# Patient Record
Sex: MALE | Race: WHITE | NOT HISPANIC OR LATINO | RURAL
[De-identification: names, ages, dates, MRNs, and addresses within clinical notes are randomized per-mention and may not be internally consistent; named-entity substitution may affect disease eponyms.]

---

## 2023-02-10 ENCOUNTER — HOSPITAL ENCOUNTER (OUTPATIENT)
Dept: RADIOLOGY | Facility: HOSPITAL | Age: 73
Discharge: HOME | End: 2023-02-10
Attending: NURSE PRACTITIONER
Payer: MEDICARE

## 2023-02-10 DIAGNOSIS — R79.89 ELEVATED SERUM CREATININE: ICD-10-CM

## 2023-02-10 PROCEDURE — 76770 US EXAM ABDO BACK WALL COMP: CPT | Mod: TC

## 2024-05-31 RX ORDER — POTASSIUM CHLORIDE 20 MEQ/1
20 TABLET, EXTENDED RELEASE ORAL 2 TIMES DAILY
Status: CANCELLED | OUTPATIENT
Start: 2024-05-31

## 2024-05-31 RX ORDER — NITROGLYCERIN 0.4 MG/1
0.4 TABLET SUBLINGUAL EVERY 5 MIN PRN
Status: CANCELLED | OUTPATIENT
Start: 2024-05-31

## 2024-05-31 RX ORDER — NAPROXEN SODIUM 220 MG/1
81 TABLET, FILM COATED ORAL DAILY
Status: CANCELLED | OUTPATIENT
Start: 2024-06-01

## 2024-05-31 RX ORDER — HYDROXYZINE HYDROCHLORIDE 25 MG/1
25 TABLET, FILM COATED ORAL 3 TIMES DAILY PRN
Status: CANCELLED | OUTPATIENT
Start: 2024-05-31

## 2024-05-31 RX ORDER — LANOLIN ALCOHOL/MO/W.PET/CERES
1 CREAM (GRAM) TOPICAL DAILY
Status: CANCELLED | OUTPATIENT
Start: 2024-06-01

## 2024-05-31 RX ORDER — HYDROCODONE BITARTRATE AND ACETAMINOPHEN 10; 325 MG/1; MG/1
1 TABLET ORAL EVERY 6 HOURS PRN
Status: CANCELLED | OUTPATIENT
Start: 2024-05-31

## 2024-05-31 RX ORDER — CITALOPRAM 20 MG/1
20 TABLET, FILM COATED ORAL DAILY
Status: CANCELLED | OUTPATIENT
Start: 2024-06-01

## 2024-05-31 RX ORDER — GLUCAGON 1 MG
1 KIT INJECTION
Status: CANCELLED | OUTPATIENT
Start: 2024-05-31

## 2024-05-31 RX ORDER — ATORVASTATIN CALCIUM 40 MG/1
40 TABLET, FILM COATED ORAL NIGHTLY
Status: CANCELLED | OUTPATIENT
Start: 2024-05-31

## 2024-05-31 RX ORDER — CHOLECALCIFEROL (VITAMIN D3) 25 MCG
1000 TABLET ORAL DAILY
Status: CANCELLED | OUTPATIENT
Start: 2024-06-01

## 2024-05-31 RX ORDER — ACETAMINOPHEN 325 MG/1
650 TABLET ORAL EVERY 6 HOURS PRN
Status: CANCELLED | OUTPATIENT
Start: 2024-05-31

## 2024-05-31 RX ORDER — ASCORBIC ACID 500 MG
500 TABLET ORAL DAILY
Status: CANCELLED | OUTPATIENT
Start: 2024-06-01

## 2024-05-31 RX ORDER — FUROSEMIDE 40 MG/1
40 TABLET ORAL DAILY
Status: CANCELLED | OUTPATIENT
Start: 2024-06-01

## 2024-05-31 RX ORDER — METHOCARBAMOL 500 MG/1
500 TABLET, FILM COATED ORAL EVERY 6 HOURS PRN
Status: CANCELLED | OUTPATIENT
Start: 2024-05-31

## 2024-05-31 RX ORDER — GABAPENTIN 300 MG/1
300 CAPSULE ORAL 2 TIMES DAILY
Status: CANCELLED | OUTPATIENT
Start: 2024-05-31

## 2024-05-31 RX ORDER — TAMSULOSIN HYDROCHLORIDE 0.4 MG/1
0.4 CAPSULE ORAL DAILY
Status: CANCELLED | OUTPATIENT
Start: 2024-06-01

## 2024-05-31 RX ORDER — SPIRONOLACTONE 25 MG/1
25 TABLET ORAL DAILY
Status: CANCELLED | OUTPATIENT
Start: 2024-06-01

## 2024-05-31 RX ORDER — AMOXICILLIN AND CLAVULANATE POTASSIUM 875; 125 MG/1; MG/1
1 TABLET, FILM COATED ORAL EVERY 12 HOURS
Status: CANCELLED | OUTPATIENT
Start: 2024-05-31 | End: 2024-06-07

## 2024-05-31 RX ORDER — ZOLPIDEM TARTRATE 5 MG/1
5 TABLET ORAL NIGHTLY PRN
Status: CANCELLED | OUTPATIENT
Start: 2024-05-31

## 2024-05-31 RX ORDER — ALBUTEROL SULFATE 90 UG/1
1 AEROSOL, METERED RESPIRATORY (INHALATION)
Status: CANCELLED | OUTPATIENT
Start: 2024-05-31

## 2024-05-31 RX ORDER — METHOTREXATE 2.5 MG/1
10 TABLET ORAL
Status: CANCELLED | OUTPATIENT
Start: 2024-06-04

## 2024-05-31 RX ORDER — CALCIUM CARBONATE 200(500)MG
500 TABLET,CHEWABLE ORAL 2 TIMES DAILY PRN
Status: CANCELLED | OUTPATIENT
Start: 2024-05-31

## 2024-05-31 RX ORDER — IBUPROFEN 200 MG
24 TABLET ORAL
Status: CANCELLED | OUTPATIENT
Start: 2024-05-31

## 2024-05-31 RX ORDER — TALC
6 POWDER (GRAM) TOPICAL NIGHTLY PRN
Status: CANCELLED | OUTPATIENT
Start: 2024-05-31

## 2024-05-31 RX ORDER — INSULIN ASPART 100 [IU]/ML
0-5 INJECTION, SOLUTION INTRAVENOUS; SUBCUTANEOUS
Status: CANCELLED | OUTPATIENT
Start: 2024-05-31

## 2024-05-31 RX ORDER — CARVEDILOL 6.25 MG/1
12.5 TABLET ORAL 2 TIMES DAILY
Status: CANCELLED | OUTPATIENT
Start: 2024-05-31

## 2024-05-31 RX ORDER — IBUPROFEN 200 MG
16 TABLET ORAL
Status: CANCELLED | OUTPATIENT
Start: 2024-05-31

## 2024-05-31 RX ORDER — BUDESONIDE 0.5 MG/2ML
0.5 INHALANT ORAL EVERY 12 HOURS
Status: CANCELLED | OUTPATIENT
Start: 2024-05-31

## 2024-05-31 RX ORDER — LANOLIN ALCOHOL/MO/W.PET/CERES
400 CREAM (GRAM) TOPICAL 3 TIMES DAILY
Status: CANCELLED | OUTPATIENT
Start: 2024-05-31

## 2024-05-31 RX ORDER — AMOXICILLIN 250 MG
1 CAPSULE ORAL 2 TIMES DAILY PRN
Status: CANCELLED | OUTPATIENT
Start: 2024-05-31

## 2024-05-31 RX ORDER — PREDNISONE 20 MG/1
40 TABLET ORAL DAILY
Status: CANCELLED | OUTPATIENT
Start: 2024-06-01

## 2024-05-31 RX ORDER — ALLOPURINOL 300 MG/1
300 TABLET ORAL DAILY
Status: CANCELLED | OUTPATIENT
Start: 2024-06-01

## 2024-05-31 RX ORDER — IPRATROPIUM BROMIDE AND ALBUTEROL SULFATE 2.5; .5 MG/3ML; MG/3ML
3 SOLUTION RESPIRATORY (INHALATION) EVERY 6 HOURS
Status: CANCELLED | OUTPATIENT
Start: 2024-05-31

## 2024-05-31 RX ORDER — HYDRALAZINE HYDROCHLORIDE 25 MG/1
100 TABLET, FILM COATED ORAL 3 TIMES DAILY
Status: CANCELLED | OUTPATIENT
Start: 2024-05-31

## 2024-06-01 NOTE — NURSING
Attempted to call back rito @ [facility] to let know pt can come tomorrow.   Number is [phone]

## 2024-06-01 NOTE — NURSING
Admissions from [facility] called to inform that pt wouldn't be coming today d/t doctor not discharging pt but will be here tomorrow.

## 2024-06-03 ENCOUNTER — HOSPITAL ENCOUNTER (INPATIENT)
Facility: HOSPITAL | Age: 74
LOS: 7 days | Discharge: HOME | DRG: 948 | End: 2024-06-10
Attending: HOSPITALIST | Admitting: HOSPITALIST
Payer: MEDICARE

## 2024-06-03 DIAGNOSIS — R53.1 GENERALIZED WEAKNESS: Primary | ICD-10-CM

## 2024-06-03 DIAGNOSIS — Z98.890 S/P LAMINECTOMY: ICD-10-CM

## 2024-06-03 PROBLEM — T14.8XXA SURGICAL WOUND PRESENT: Status: ACTIVE | Noted: 2024-06-03

## 2024-06-03 LAB
GLUCOSE SERPL-MCNC: 187 MG/DL (ref 70–105)
GLUCOSE SERPL-MCNC: 247 MG/DL (ref 70–105)

## 2024-06-03 PROCEDURE — 99900035 HC TECH TIME PER 15 MIN (STAT)

## 2024-06-03 PROCEDURE — 11000004 HC SNF PRIVATE

## 2024-06-03 PROCEDURE — 25000003 PHARM REV CODE 250: Performed by: NURSE PRACTITIONER

## 2024-06-03 PROCEDURE — 99900039 HC SLP GENERIC THERAPY SCREENING (STAT)

## 2024-06-03 PROCEDURE — 63600175 PHARM REV CODE 636 W HCPCS: Performed by: NURSE PRACTITIONER

## 2024-06-03 PROCEDURE — 97166 OT EVAL MOD COMPLEX 45 MIN: CPT

## 2024-06-03 PROCEDURE — 82962 GLUCOSE BLOOD TEST: CPT

## 2024-06-03 PROCEDURE — 94640 AIRWAY INHALATION TREATMENT: CPT

## 2024-06-03 PROCEDURE — 25000242 PHARM REV CODE 250 ALT 637 W/ HCPCS: Performed by: NURSE PRACTITIONER

## 2024-06-03 RX ORDER — ALBUTEROL SULFATE 0.83 MG/ML
2.5 SOLUTION RESPIRATORY (INHALATION) EVERY 4 HOURS PRN
Status: DISCONTINUED | OUTPATIENT
Start: 2024-06-03 | End: 2024-06-10 | Stop reason: HOSPADM

## 2024-06-03 RX ORDER — TAMSULOSIN HYDROCHLORIDE 0.4 MG/1
0.4 CAPSULE ORAL DAILY
COMMUNITY

## 2024-06-03 RX ORDER — ALLOPURINOL 300 MG/1
300 TABLET ORAL DAILY
Status: DISCONTINUED | OUTPATIENT
Start: 2024-06-04 | End: 2024-06-10 | Stop reason: HOSPADM

## 2024-06-03 RX ORDER — ZOLPIDEM TARTRATE 5 MG/1
10 TABLET ORAL NIGHTLY PRN
Status: DISCONTINUED | OUTPATIENT
Start: 2024-06-03 | End: 2024-06-10 | Stop reason: HOSPADM

## 2024-06-03 RX ORDER — ATORVASTATIN CALCIUM 40 MG/1
40 TABLET, FILM COATED ORAL NIGHTLY
Status: DISCONTINUED | OUTPATIENT
Start: 2024-06-03 | End: 2024-06-10 | Stop reason: HOSPADM

## 2024-06-03 RX ORDER — TALC
6 POWDER (GRAM) TOPICAL NIGHTLY PRN
Status: DISCONTINUED | OUTPATIENT
Start: 2024-06-03 | End: 2024-06-03

## 2024-06-03 RX ORDER — HYDROXYZINE HYDROCHLORIDE 25 MG/1
25 TABLET, FILM COATED ORAL 3 TIMES DAILY PRN
COMMUNITY

## 2024-06-03 RX ORDER — IBUPROFEN 200 MG
16 TABLET ORAL
Status: DISCONTINUED | OUTPATIENT
Start: 2024-06-03 | End: 2024-06-06

## 2024-06-03 RX ORDER — METFORMIN HYDROCHLORIDE 1000 MG/1
1000 TABLET ORAL 2 TIMES DAILY WITH MEALS
COMMUNITY

## 2024-06-03 RX ORDER — AMOXICILLIN AND CLAVULANATE POTASSIUM 875; 125 MG/1; MG/1
1 TABLET, FILM COATED ORAL EVERY 12 HOURS
Status: COMPLETED | OUTPATIENT
Start: 2024-06-03 | End: 2024-06-10

## 2024-06-03 RX ORDER — GABAPENTIN 300 MG/1
300 CAPSULE ORAL 2 TIMES DAILY
Status: DISCONTINUED | OUTPATIENT
Start: 2024-06-03 | End: 2024-06-10 | Stop reason: HOSPADM

## 2024-06-03 RX ORDER — ASCORBIC ACID 500 MG
500 TABLET ORAL DAILY
Status: DISCONTINUED | OUTPATIENT
Start: 2024-06-04 | End: 2024-06-10 | Stop reason: HOSPADM

## 2024-06-03 RX ORDER — BUDESONIDE 0.5 MG/2ML
0.5 INHALANT ORAL EVERY 12 HOURS
Status: DISCONTINUED | OUTPATIENT
Start: 2024-06-03 | End: 2024-06-10 | Stop reason: HOSPADM

## 2024-06-03 RX ORDER — LANOLIN ALCOHOL/MO/W.PET/CERES
400 CREAM (GRAM) TOPICAL 3 TIMES DAILY
Status: DISCONTINUED | OUTPATIENT
Start: 2024-06-03 | End: 2024-06-10 | Stop reason: HOSPADM

## 2024-06-03 RX ORDER — GLUCAGON 1 MG
1 KIT INJECTION
Status: DISCONTINUED | OUTPATIENT
Start: 2024-06-03 | End: 2024-06-06

## 2024-06-03 RX ORDER — HYDROCODONE BITARTRATE AND ACETAMINOPHEN 5; 325 MG/1; MG/1
1 TABLET ORAL EVERY 12 HOURS PRN
COMMUNITY

## 2024-06-03 RX ORDER — INSULIN ASPART 100 [IU]/ML
0-5 INJECTION, SOLUTION INTRAVENOUS; SUBCUTANEOUS
Status: DISCONTINUED | OUTPATIENT
Start: 2024-06-03 | End: 2024-06-06

## 2024-06-03 RX ORDER — IPRATROPIUM BROMIDE AND ALBUTEROL SULFATE 2.5; .5 MG/3ML; MG/3ML
3 SOLUTION RESPIRATORY (INHALATION) EVERY 6 HOURS
Status: DISCONTINUED | OUTPATIENT
Start: 2024-06-03 | End: 2024-06-10 | Stop reason: HOSPADM

## 2024-06-03 RX ORDER — METHOCARBAMOL 500 MG/1
500 TABLET, FILM COATED ORAL EVERY 6 HOURS PRN
Status: DISCONTINUED | OUTPATIENT
Start: 2024-06-03 | End: 2024-06-10 | Stop reason: HOSPADM

## 2024-06-03 RX ORDER — CALCIUM CARBONATE 200(500)MG
500 TABLET,CHEWABLE ORAL 2 TIMES DAILY PRN
Status: DISCONTINUED | OUTPATIENT
Start: 2024-06-03 | End: 2024-06-10 | Stop reason: HOSPADM

## 2024-06-03 RX ORDER — PREDNISONE 20 MG/1
40 TABLET ORAL DAILY
Status: DISCONTINUED | OUTPATIENT
Start: 2024-06-04 | End: 2024-06-07

## 2024-06-03 RX ORDER — LANOLIN ALCOHOL/MO/W.PET/CERES
1 CREAM (GRAM) TOPICAL DAILY
Status: DISCONTINUED | OUTPATIENT
Start: 2024-06-04 | End: 2024-06-10 | Stop reason: HOSPADM

## 2024-06-03 RX ORDER — SPIRONOLACTONE 25 MG/1
25 TABLET ORAL DAILY
COMMUNITY

## 2024-06-03 RX ORDER — HYDROXYZINE PAMOATE 25 MG/1
25 CAPSULE ORAL EVERY 8 HOURS PRN
Status: DISCONTINUED | OUTPATIENT
Start: 2024-06-03 | End: 2024-06-10 | Stop reason: HOSPADM

## 2024-06-03 RX ORDER — ACETAMINOPHEN 325 MG/1
650 TABLET ORAL EVERY 6 HOURS PRN
Status: DISCONTINUED | OUTPATIENT
Start: 2024-06-03 | End: 2024-06-10 | Stop reason: HOSPADM

## 2024-06-03 RX ORDER — TAMSULOSIN HYDROCHLORIDE 0.4 MG/1
0.4 CAPSULE ORAL DAILY
Status: DISCONTINUED | OUTPATIENT
Start: 2024-06-04 | End: 2024-06-10 | Stop reason: HOSPADM

## 2024-06-03 RX ORDER — CHOLECALCIFEROL (VITAMIN D3) 25 MCG
1000 TABLET ORAL DAILY
Status: DISCONTINUED | OUTPATIENT
Start: 2024-06-04 | End: 2024-06-10 | Stop reason: HOSPADM

## 2024-06-03 RX ORDER — CARVEDILOL 6.25 MG/1
12.5 TABLET ORAL 2 TIMES DAILY WITH MEALS
Status: DISCONTINUED | OUTPATIENT
Start: 2024-06-03 | End: 2024-06-10 | Stop reason: HOSPADM

## 2024-06-03 RX ORDER — NAPROXEN SODIUM 220 MG/1
81 TABLET, FILM COATED ORAL DAILY
Status: DISCONTINUED | OUTPATIENT
Start: 2024-06-04 | End: 2024-06-10 | Stop reason: HOSPADM

## 2024-06-03 RX ORDER — ZOLPIDEM TARTRATE 10 MG/1
10 TABLET ORAL NIGHTLY PRN
COMMUNITY

## 2024-06-03 RX ORDER — AMOXICILLIN 250 MG
1 CAPSULE ORAL 2 TIMES DAILY PRN
Status: DISCONTINUED | OUTPATIENT
Start: 2024-06-03 | End: 2024-06-10 | Stop reason: HOSPADM

## 2024-06-03 RX ORDER — CARVEDILOL 12.5 MG/1
12.5 TABLET ORAL 2 TIMES DAILY WITH MEALS
COMMUNITY

## 2024-06-03 RX ORDER — HYDROCODONE BITARTRATE AND ACETAMINOPHEN 10; 325 MG/1; MG/1
1 TABLET ORAL EVERY 6 HOURS PRN
Status: DISCONTINUED | OUTPATIENT
Start: 2024-06-03 | End: 2024-06-10 | Stop reason: HOSPADM

## 2024-06-03 RX ORDER — GABAPENTIN 300 MG/1
300 CAPSULE ORAL 2 TIMES DAILY
COMMUNITY

## 2024-06-03 RX ORDER — FUROSEMIDE 40 MG/1
40 TABLET ORAL DAILY
Status: DISCONTINUED | OUTPATIENT
Start: 2024-06-04 | End: 2024-06-10 | Stop reason: HOSPADM

## 2024-06-03 RX ORDER — NITROGLYCERIN 0.4 MG/1
0.4 TABLET SUBLINGUAL EVERY 5 MIN PRN
Status: DISCONTINUED | OUTPATIENT
Start: 2024-06-03 | End: 2024-06-10 | Stop reason: HOSPADM

## 2024-06-03 RX ORDER — IBUPROFEN 200 MG
24 TABLET ORAL
Status: DISCONTINUED | OUTPATIENT
Start: 2024-06-03 | End: 2024-06-06

## 2024-06-03 RX ORDER — HYDRALAZINE HYDROCHLORIDE 25 MG/1
100 TABLET, FILM COATED ORAL EVERY 12 HOURS
Status: DISCONTINUED | OUTPATIENT
Start: 2024-06-03 | End: 2024-06-10 | Stop reason: HOSPADM

## 2024-06-03 RX ORDER — ALBUTEROL SULFATE 90 UG/1
2 AEROSOL, METERED RESPIRATORY (INHALATION)
Status: DISCONTINUED | OUTPATIENT
Start: 2024-06-03 | End: 2024-06-03

## 2024-06-03 RX ORDER — SPIRONOLACTONE 25 MG/1
25 TABLET ORAL DAILY
Status: DISCONTINUED | OUTPATIENT
Start: 2024-06-04 | End: 2024-06-10 | Stop reason: HOSPADM

## 2024-06-03 RX ORDER — PREDNISONE 20 MG/1
20 TABLET ORAL DAILY
COMMUNITY

## 2024-06-03 RX ADMIN — IPRATROPIUM BROMIDE AND ALBUTEROL SULFATE 3 ML: 2.5; .5 SOLUTION RESPIRATORY (INHALATION) at 07:06

## 2024-06-03 RX ADMIN — IPRATROPIUM BROMIDE AND ALBUTEROL SULFATE 3 ML: 2.5; .5 SOLUTION RESPIRATORY (INHALATION) at 11:06

## 2024-06-03 RX ADMIN — BUDESONIDE INHALATION 0.5 MG: 0.5 SUSPENSION RESPIRATORY (INHALATION) at 07:06

## 2024-06-03 RX ADMIN — CARVEDILOL 12.5 MG: 6.25 TABLET, FILM COATED ORAL at 04:06

## 2024-06-03 RX ADMIN — AMOXICILLIN AND CLAVULANATE POTASSIUM 1 TABLET: 875; 125 TABLET, FILM COATED ORAL at 08:06

## 2024-06-03 RX ADMIN — INSULIN ASPART 1 UNITS: 100 INJECTION, SOLUTION INTRAVENOUS; SUBCUTANEOUS at 08:06

## 2024-06-03 RX ADMIN — ZOLPIDEM TARTRATE 10 MG: 5 TABLET ORAL at 08:06

## 2024-06-03 RX ADMIN — Medication 400 MG: at 08:06

## 2024-06-03 RX ADMIN — METHOCARBAMOL 500 MG: 500 TABLET ORAL at 08:06

## 2024-06-03 RX ADMIN — ATORVASTATIN CALCIUM 40 MG: 40 TABLET, FILM COATED ORAL at 08:06

## 2024-06-03 RX ADMIN — HYDRALAZINE HYDROCHLORIDE 100 MG: 25 TABLET ORAL at 08:06

## 2024-06-03 RX ADMIN — GABAPENTIN 300 MG: 300 CAPSULE ORAL at 08:06

## 2024-06-03 NOTE — PLAN OF CARE
Problem: Adult Inpatient Plan of Care  Goal: Plan of Care Review  Outcome: Progressing  Flowsheets (Taken 6/3/2024 1325)  Plan of Care Reviewed With: patient   Plan of care reviewed with patient. Patients status ongoing progressing.

## 2024-06-03 NOTE — PT/OT/SLP EVAL
SLP Screen    Date:6/3/24    SLP Screen completed this date. No skilled ST services warranted at this time. Reconsult ST upon change in status.     Brigid Dia M.S[facility] [place]-SLP

## 2024-06-03 NOTE — PLAN OF CARE
[facility]  Initial Discharge Assessment       Primary Care Provider: Lesley Henry FNP    Admission Diagnosis: No admission diagnoses are documented for this encounter.    Admission Date: 6/3/2024  Expected Discharge Date:     Transition of Care Barriers: None    Payor: MEDICARE / Plan: MEDICARE PART A & B / Product Type: Government /     No emergency contact information on file.    Discharge Plan A: Home Health       No Pharmacies Listed    Initial Assessment (most recent)       Adult Discharge Assessment - 06/03/24 0165          Discharge Assessment    Assessment Type Discharge Planning Assessment     Confirmed/corrected address, phone number and insurance Yes     Source of Information patient     Communicated JONNIE with patient/caregiver Date not available/Unable to determine     Reason For Admission inpt PT/OT     People in Home alone     Facility Arrived From: [facility]     Do you expect to return to your current living situation? Yes     Do you have help at home or someone to help you manage your care at home? Yes     Prior to hospitilization cognitive status: Unable to Assess     Current cognitive status: Alert/Oriented     Walking or Climbing Stairs Difficulty yes     Walking or Climbing Stairs ambulation difficulty, requires equipment;stair climbing difficulty, requires equipment;transferring difficulty, assistance 1 person     Dressing/Bathing Difficulty no     Home Accessibility wheelchair accessible     Home Layout Able to live on 1st floor     Equipment Currently Used at Home cane, straight;walker, standard     Readmission within 30 days? No     Patient currently being followed by outpatient case management? No     Do you currently have service(s) that help you manage your care at home? Yes     Name and Contact number of agency does not know home health company     Is the pt/caregiver preference to resume services with current agency Yes     Do you take prescription  medications? Yes     Do you have prescription coverage? Yes     Do you have any problems affording any of your prescribed medications? No     Is the patient taking medications as prescribed? yes     Who is going to help you get home at discharge? Alejandro, his friend     How do you get to doctors appointments? car, drives self;family or friend will provide     Do you take coumadin? No     Discharge Plan A Home Health     DME Needed Upon Discharge  other (see comments)   tbd    Discharge Plan discussed with: Patient;Friend     Transition of Care Barriers None        Physical Activity    On average, how many days per week do you engage in moderate to strenuous exercise (like a brisk walk)? 0 days     On average, how many minutes do you engage in exercise at this level? 0 min        Financial Resource Strain    How hard is it for you to pay for the very basics like food, housing, medical care, and heating? Not hard at all        Housing Stability    In the last 12 months, was there a time when you were not able to pay the mortgage or rent on time? No     At any time in the past 12 months, were you homeless or living in a shelter (including now)? No        Transportation Needs    Has the lack of transportation kept you from medical appointments, meetings, work or from getting things needed for daily living? No        Food Insecurity    Within the past 12 months, you worried that your food would run out before you got the money to buy more. Never true     Within the past 12 months, the food you bought just didn't last and you didn't have money to get more. Never true        Stress    Do you feel stress - tense, restless, nervous, or anxious, or unable to sleep at night because your mind is troubled all the time - these days? Not at all        Social Isolation    How often do you feel lonely or isolated from those around you?  Sometimes        Alcohol Use    Q1: How often do you have a drink containing alcohol? Never     Q2:  How many drinks containing alcohol do you have on a typical day when you are drinking? Patient does not drink     Q3: How often do you have six or more drinks on one occasion? Never        Spindrift Beverageities    In the past 12 months has the electric, gas, oil, or water company threatened to shut off services in your home? No        Health Literacy    How often do you need to have someone help you when you read instructions, pamphlets, or other written material from your doctor or pharmacy? Never                     Mr. Madera admissted to [facility] for inpatient treatment after having a surgery. He states he is independent at baseline. He lives alone and Is followed by Beau Dickson. Pharmacy is [facility]. Will continue to follow for dc needs.

## 2024-06-03 NOTE — PT/OT/SLP EVAL
"Occupational Therapy   Evaluation    Name: Mo Madera  MRN: [id number]  Admitting Diagnosis: s/p laminectomy lumbar  Recent Surgery: * No surgery found *      Recommendations:     Discharge Recommendations: Low Intensity Therapy  Discharge Equipment Recommendations:  none  Barriers to discharge:  Decreased caregiver support    Assessment:     Mo Madera is a 74 y.o. male with a medical diagnosis of s/p laminectomy lumbar.  He presents with "I know I have to get up and move to get better.". Performance deficits affecting function: weakness, impaired endurance, impaired self care skills, impaired functional mobility, gait instability, impaired balance, decreased safety awareness, pain, impaired skin.      Rehab Prognosis: Good; patient would benefit from acute skilled OT services to address these deficits and reach maximum level of function.       Plan:     Patient to be seen 5 x/week to address the above listed problems via self-care/home management, community/work re-entry, therapeutic activities, therapeutic exercises, neuromuscular re-education, wheelchair management/training  Plan of Care Expires: 06/28/24  Plan of Care Reviewed with: patient    Subjective     Chief Complaint: pain  Patient/Family Comments/goals: get back home, able to do for myself    Occupational Profile:  Living Environment: double wide with ramped entrance  Previous level of function: was driving self, used cane or SW to get around home, had bath bench as well with grab bar in standard tub/shower unit  Roles and Routines: retired  Equipment Used at Home: cane, straight, walker, standard, grab bar, bath bench, hip kit  Assistance upon Discharge: intermittent svn from family with assist as needed    Pain/Comfort:  Pain Rating 1: 5/10  Location - Side 1: Bilateral  Location 1: back  Pain Addressed 1: Reposition, Distraction, Cessation of Activity  Pain Rating Post-Intervention 1: 2/10    Patients cultural, spiritual, Orthodox conflicts given " the current situation: no    Objective:     Communicated with: pt prior to session.  Patient found HOB elevated with  (no lines or drains) upon OT entry to room.    General Precautions: Standard, fall, hearing impaired  Orthopedic Precautions: Full weight bearing  Braces: N/A  Respiratory Status: Room air    Occupational Performance:    Bed Mobility:    Patient completed Rolling/Turning to Left with  minimum assistance  Patient completed Rolling/Turning to Right with moderate assistance  Patient completed Scooting/Bridging with moderate assistance  Patient completed Supine to Sit with minimum assistance  Patient completed Sit to Supine with moderate assistance    Functional Mobility/Transfers:  Patient completed Sit <> Stand Transfer with moderate assistance  with  hand-held assist and grab bars(s)   Functional Mobility: none attempted other than bed mobility today    Activities of Daily Living:  Feeding:  independence at bedside after setup  Grooming: minimum assistance for thoroughness and assist to setup at bedside  Bathing: moderate assistance overall (min a UB, max a LB)  Upper Body Dressing: minimum assistance bedside  Lower Body Dressing: moderate assistance pt is supposed to have ADL equipment for LB dressing per notes from previous therapy at surgical hospital, will implement here as pt can tolerate  Toileting: moderate assistance for transfer and for pericare during BM    Cognitive/Visual Perceptual:  Cognitive/Psychosocial Skills:     -       Oriented to: Person, Place, Time, and Situation   -       Follows Commands/attention:Follows two-step commands  -       Communication: clear/fluent  -       Memory: No Deficits noted  -       Safety awareness/insight to disability: none noted today, but therapy from previous hospital indicated pt was having some safety deficits observed.  Possibly due to post anesthesia; will follow   -       Mood/Affect/Coping skills/emotional control: Appropriate to situation,  Cooperative, and Pleasant  Visual/Perceptual:      -Intact no problems noted today    Physical Exam:  Balance:    -       sitting up EOB unsupported soft surface=SBA, standing static=fair -, did not attempt dynamic standing  Dominant hand:    -       right  Upper Extremity Range of Motion:     -       Right Upper Extremity: Deficits: 75% of AROM sh flexion, rest of joints UB WNL  -       Left Upper Extremity: same  Upper Extremity Strength:    -       Right Upper Extremity: Deficits: 3+/5  -       Left Upper Extremity: Deficits: 3+/5   Strength:    -       Right Upper Extremity: WFL  -       Left Upper Extremity: WFL  Fine Motor Coordination:    -       Intact  Gross motor coordination:   WFL    AMPAC 6 Click ADL:  AMPAC Total Score: 16    Treatment & Education:  EVAL completed today; begin tx sessions tomorrow    Patient left HOB elevated with call button in reach and nursing present    GOALS:   Multidisciplinary Problems       Occupational Therapy Goals          Problem: Occupational Therapy    Goal Priority Disciplines Outcome Interventions   Occupational Therapy Goal     OT, PT/OT Progressing    Description: STG: within 2 weeks  Pt will perform grooming with setup at sinkside  Pt will bathe with mod I with AE as needed from sinkside  Pt will perform UE dressing with SVN after setup  Pt will perform LE dressing with SBA with AE as needed at bedside  Pt will sit EOB x 15 min with SVN assistance  Pt will transfer bed/chair/bsc with SBA  Pt will perform standing task x 5 min with SVN assistance  Pt will tolerate 45 minutes of tx without fatigue      LT.Restore to max I with self care and mobility.                         History:     Past Medical History:   Diagnosis Date    COPD (chronic obstructive pulmonary disease)     Diabetes mellitus     Hypertension          Past Surgical History:   Procedure Laterality Date    BACK SURGERY         Time Tracking:     OT Date of Treatment: 24  OT Start Time:  1500  OT Stop Time: 1518  OT Total Time (min): 18 min    Billable Minutes:Evaluation 18    6/3/2024

## 2024-06-03 NOTE — NURSING
VERIFIED HOME MEDICATIONS WITH [facility] IN [place], [place]

## 2024-06-03 NOTE — PLAN OF CARE
Problem: Occupational Therapy  Goal: Occupational Therapy Goal  Description: STG: within 2 weeks  Pt will perform grooming with setup at sinkside  Pt will bathe with mod I with AE as needed from sinkside  Pt will perform UE dressing with SVN after setup  Pt will perform LE dressing with SBA with AE as needed at bedside  Pt will sit EOB x 15 min with SVN assistance  Pt will transfer bed/chair/bsc with SBA  Pt will perform standing task x 5 min with SVN assistance  Pt will tolerate 45 minutes of tx without fatigue      LT.Restore to max I with self care and mobility.    Outcome: Progressing

## 2024-06-04 PROBLEM — E11.9 DM2 (DIABETES MELLITUS, TYPE 2): Status: ACTIVE | Noted: 2024-06-04

## 2024-06-04 LAB
ANION GAP SERPL CALCULATED.3IONS-SCNC: 10 MMOL/L (ref 7–16)
BASOPHILS # BLD AUTO: 0.02 K/UL (ref 0–0.2)
BASOPHILS NFR BLD AUTO: 0.3 % (ref 0–1)
BUN SERPL-MCNC: 20 MG/DL (ref 7–18)
BUN/CREAT SERPL: 18 (ref 6–20)
CALCIUM SERPL-MCNC: 8.4 MG/DL (ref 8.5–10.1)
CHLORIDE SERPL-SCNC: 97 MMOL/L (ref 98–107)
CO2 SERPL-SCNC: 30 MMOL/L (ref 21–32)
CREAT SERPL-MCNC: 1.14 MG/DL (ref 0.7–1.3)
DIFFERENTIAL METHOD BLD: ABNORMAL
EGFR (NO RACE VARIABLE) (RUSH/TITUS): 68 ML/MIN/1.73M2
EOSINOPHIL # BLD AUTO: 0.14 K/UL (ref 0–0.5)
EOSINOPHIL NFR BLD AUTO: 2.1 % (ref 1–4)
ERYTHROCYTE [DISTWIDTH] IN BLOOD BY AUTOMATED COUNT: 12.4 % (ref 11.5–14.5)
GLUCOSE SERPL-MCNC: 154 MG/DL (ref 74–106)
GLUCOSE SERPL-MCNC: 156 MG/DL (ref 70–105)
GLUCOSE SERPL-MCNC: 297 MG/DL (ref 70–105)
GLUCOSE SERPL-MCNC: 409 MG/DL (ref 70–105)
GLUCOSE SERPL-MCNC: 429 MG/DL (ref 70–105)
HCT VFR BLD AUTO: 27 % (ref 40–54)
HGB BLD-MCNC: 8.7 G/DL (ref 13.5–18)
LYMPHOCYTES # BLD AUTO: 1.7 K/UL (ref 1–4.8)
LYMPHOCYTES NFR BLD AUTO: 25.8 % (ref 27–41)
MCH RBC QN AUTO: 29.2 PG (ref 27–31)
MCHC RBC AUTO-ENTMCNC: 32.2 G/DL (ref 32–36)
MCV RBC AUTO: 90.6 FL (ref 80–96)
MONOCYTES # BLD AUTO: 1.14 K/UL (ref 0–0.8)
MONOCYTES NFR BLD AUTO: 17.3 % (ref 2–6)
MPC BLD CALC-MCNC: 8.8 FL (ref 9.4–12.4)
NEUTROPHILS # BLD AUTO: 3.58 K/UL (ref 1.8–7.7)
NEUTROPHILS NFR BLD AUTO: 54.5 % (ref 53–65)
PLATELET # BLD AUTO: 306 K/UL (ref 150–400)
POTASSIUM SERPL-SCNC: 3.8 MMOL/L (ref 3.5–5.1)
RBC # BLD AUTO: 2.98 M/UL (ref 4.6–6.2)
SODIUM SERPL-SCNC: 133 MMOL/L (ref 136–145)
WBC # BLD AUTO: 6.58 K/UL (ref 4.5–11)

## 2024-06-04 PROCEDURE — 25000003 PHARM REV CODE 250: Performed by: NURSE PRACTITIONER

## 2024-06-04 PROCEDURE — 97163 PT EVAL HIGH COMPLEX 45 MIN: CPT

## 2024-06-04 PROCEDURE — 63600175 PHARM REV CODE 636 W HCPCS: Performed by: NURSE PRACTITIONER

## 2024-06-04 PROCEDURE — 94640 AIRWAY INHALATION TREATMENT: CPT

## 2024-06-04 PROCEDURE — 80048 BASIC METABOLIC PNL TOTAL CA: CPT | Performed by: HOSPITALIST

## 2024-06-04 PROCEDURE — 25000242 PHARM REV CODE 250 ALT 637 W/ HCPCS: Performed by: NURSE PRACTITIONER

## 2024-06-04 PROCEDURE — 97535 SELF CARE MNGMENT TRAINING: CPT

## 2024-06-04 PROCEDURE — 97110 THERAPEUTIC EXERCISES: CPT

## 2024-06-04 PROCEDURE — 85025 COMPLETE CBC W/AUTO DIFF WBC: CPT | Performed by: HOSPITALIST

## 2024-06-04 PROCEDURE — 97530 THERAPEUTIC ACTIVITIES: CPT

## 2024-06-04 PROCEDURE — 36415 COLL VENOUS BLD VENIPUNCTURE: CPT | Performed by: HOSPITALIST

## 2024-06-04 PROCEDURE — 11000004 HC SNF PRIVATE

## 2024-06-04 PROCEDURE — 82962 GLUCOSE BLOOD TEST: CPT

## 2024-06-04 PROCEDURE — 99305 1ST NF CARE MODERATE MDM 35: CPT | Mod: AI,,, | Performed by: HOSPITALIST

## 2024-06-04 RX ADMIN — IPRATROPIUM BROMIDE AND ALBUTEROL SULFATE 3 ML: 2.5; .5 SOLUTION RESPIRATORY (INHALATION) at 07:06

## 2024-06-04 RX ADMIN — TAMSULOSIN HYDROCHLORIDE 0.4 MG: 0.4 CAPSULE ORAL at 08:06

## 2024-06-04 RX ADMIN — AMOXICILLIN AND CLAVULANATE POTASSIUM 1 TABLET: 875; 125 TABLET, FILM COATED ORAL at 08:06

## 2024-06-04 RX ADMIN — CARVEDILOL 12.5 MG: 6.25 TABLET, FILM COATED ORAL at 04:06

## 2024-06-04 RX ADMIN — HYDRALAZINE HYDROCHLORIDE 100 MG: 25 TABLET ORAL at 08:06

## 2024-06-04 RX ADMIN — GABAPENTIN 300 MG: 300 CAPSULE ORAL at 08:06

## 2024-06-04 RX ADMIN — THERA TABS 1 TABLET: TAB at 08:06

## 2024-06-04 RX ADMIN — Medication 1000 UNITS: at 08:06

## 2024-06-04 RX ADMIN — SPIRONOLACTONE 25 MG: 25 TABLET ORAL at 08:06

## 2024-06-04 RX ADMIN — OXYCODONE HYDROCHLORIDE AND ACETAMINOPHEN 500 MG: 500 TABLET ORAL at 08:06

## 2024-06-04 RX ADMIN — ATORVASTATIN CALCIUM 40 MG: 40 TABLET, FILM COATED ORAL at 08:06

## 2024-06-04 RX ADMIN — INSULIN ASPART 5 UNITS: 100 INJECTION, SOLUTION INTRAVENOUS; SUBCUTANEOUS at 05:06

## 2024-06-04 RX ADMIN — HYDROCODONE BITARTRATE AND ACETAMINOPHEN 1 TABLET: 10; 325 TABLET ORAL at 08:06

## 2024-06-04 RX ADMIN — PREDNISONE 40 MG: 20 TABLET ORAL at 08:06

## 2024-06-04 RX ADMIN — IPRATROPIUM BROMIDE AND ALBUTEROL SULFATE 3 ML: 2.5; .5 SOLUTION RESPIRATORY (INHALATION) at 01:06

## 2024-06-04 RX ADMIN — FERROUS SULFATE TAB 325 MG (65 MG ELEMENTAL FE) 1 EACH: 325 (65 FE) TAB at 08:06

## 2024-06-04 RX ADMIN — Medication 400 MG: at 08:06

## 2024-06-04 RX ADMIN — Medication 400 MG: at 03:06

## 2024-06-04 RX ADMIN — BUDESONIDE INHALATION 0.5 MG: 0.5 SUSPENSION RESPIRATORY (INHALATION) at 07:06

## 2024-06-04 RX ADMIN — INSULIN ASPART 3 UNITS: 100 INJECTION, SOLUTION INTRAVENOUS; SUBCUTANEOUS at 11:06

## 2024-06-04 RX ADMIN — INSULIN ASPART 3 UNITS: 100 INJECTION, SOLUTION INTRAVENOUS; SUBCUTANEOUS at 08:06

## 2024-06-04 RX ADMIN — CARVEDILOL 12.5 MG: 6.25 TABLET, FILM COATED ORAL at 08:06

## 2024-06-04 RX ADMIN — ASPIRIN 81 MG CHEWABLE TABLET 81 MG: 81 TABLET CHEWABLE at 08:06

## 2024-06-04 RX ADMIN — ZOLPIDEM TARTRATE 10 MG: 5 TABLET ORAL at 08:06

## 2024-06-04 RX ADMIN — FUROSEMIDE 40 MG: 40 TABLET ORAL at 08:06

## 2024-06-04 RX ADMIN — ALLOPURINOL 300 MG: 300 TABLET ORAL at 08:06

## 2024-06-04 NOTE — PLAN OF CARE
Problem: Physical Therapy  Goal: Physical Therapy Goal  Description: STG - 1 week  1. Pt will transfer sit to stand with SBA.  2. Pt will amb 50 ft with CGA with appropriate AD.  3. Pt will transfer supine to/from sit with min Ax1.    LTG - 3 weeks  1. Pt will transfer sit to stand with SVN.  2. Pt will amb 250 ft with SBA with appropriate AD.  3. Pt will negotiate steps with rail with CGA.  4. Pt will negotiate ramp using AD with SBA.  5. Pt will transfer supine to/from sit with SBA.  Outcome: Progressing

## 2024-06-04 NOTE — PLAN OF CARE
Problem: Adult Inpatient Plan of Care  Goal: Plan of Care Review  Outcome: Progressing  Goal: Patient-Specific Goal (Individualized)  Outcome: Progressing  Goal: Absence of Hospital-Acquired Illness or Injury  Outcome: Progressing  Goal: Optimal Comfort and Wellbeing  Outcome: Progressing  Goal: Readiness for Transition of Care  Outcome: Progressing     Problem: Fall Injury Risk  Goal: Absence of Fall and Fall-Related Injury  Outcome: Progressing     Problem: Skin Injury Risk Increased  Goal: Skin Health and Integrity  Outcome: Progressing     Problem: Bariatric Environmental Safety  Goal: Safety Maintained with Care  Outcome: Progressing     Problem: Wound  Goal: Optimal Coping  Outcome: Progressing  Goal: Optimal Functional Ability  Outcome: Progressing  Goal: Absence of Infection Signs and Symptoms  Outcome: Progressing  Goal: Improved Oral Intake  Outcome: Progressing  Goal: Optimal Pain Control and Function  Outcome: Progressing  Goal: Skin Health and Integrity  Outcome: Progressing  Goal: Optimal Wound Healing  Outcome: Progressing

## 2024-06-04 NOTE — PROGRESS NOTES
"[facility] Medicine  Progress Note    Patient Name: Mo Madera  MRN: [id number]  Patient Class: IP- Swing   Admission Date: 6/3/2024  Length of Stay: 0 days  Attending Physician: Carlos Perez DO  Primary Care Provider: Lesley Henry FNP        Subjective:     Principal Problem:Generalized weakness        HPI:   Patient is a 74 y.o. white male with PMHx of DM, HTN, and COPD with a medical diagnosis of s/p lumbar laminectomy performed at [facility].  Per RT patient presents with "I know I have to get up and move to get better". Performance deficits affecting function: weakness, impaired endurance, impaired self care skills, impaired functional mobility, gait instability, impaired balance, decreased safety awareness, pain, impaired skin.     Overview/Hospital Course:  6/03 Patient supine in bed with eyes open in NAD. Patient current pain level 5/10. He states pain not too severe until he tries to move. Midline incision lower back with no drainage or redness of surrounding soft tissue noted. Wound edges well-approximated with staples intact. Patient states he was seen by PT on admission and scheduled to began PT tomorrow. He expresses understanding that PT will be uncomfortable by knows it is necessary to return to normal ADLs.    Review of Systems   Constitutional:  Negative for chills and fever.   Respiratory: Negative.  Negative for apnea, cough, choking, chest tightness, shortness of breath, wheezing and stridor.    Cardiovascular: Negative.  Negative for chest pain, palpitations and leg swelling.   Gastrointestinal: Negative.  Negative for abdominal distention and abdominal pain.   Genitourinary: Negative.    Musculoskeletal:  Positive for back pain.   Skin:  Positive for wound.        Surgical wound lumbar spine   Neurological:  Positive for weakness. Negative for dizziness, tremors, seizures, syncope, facial asymmetry, speech difficulty, light-headedness, numbness " and headaches.   Psychiatric/Behavioral: Negative.     All other systems reviewed and are negative.    Objective:     Vital Signs (Most Recent):  Temp: 98.8 °F (37.1 °C) (06/03/24 1929)  Pulse: 88 (06/03/24 1929)  Resp: 20 (06/03/24 1929)  BP: 129/72 (06/03/24 1929)  SpO2: (!) 94 % (06/03/24 1929) Vital Signs (24h Range):  Temp:  [97.8 °F (36.6 °C)-98.8 °F (37.1 °C)] 98.8 °F (37.1 °C)  Pulse:  [83-88] 88  Resp:  [18-20] 20  SpO2:  [94 %-100 %] 94 %  BP: (129-138)/(66-72) 129/72     Weight: 98.4 kg (217 lb)  Body mass index is 41 kg/m².    Intake/Output Summary (Last 24 hours) at 6/3/2024 2015  Last data filed at 6/3/2024 1850  Gross per 24 hour   Intake 120 ml   Output 900 ml   Net -780 ml         Physical Exam  Vitals and nursing note reviewed.   Constitutional:       General: He is not in acute distress.     Appearance: Normal appearance. He is normal weight. He is not ill-appearing.   HENT:      Mouth/Throat:      Mouth: Mucous membranes are moist.   Eyes:      Extraocular Movements: Extraocular movements intact.      Conjunctiva/sclera: Conjunctivae normal.   Cardiovascular:      Rate and Rhythm: Normal rate and regular rhythm.      Pulses: Normal pulses.      Heart sounds: Normal heart sounds. No murmur heard.     No friction rub. No gallop.   Pulmonary:      Effort: Pulmonary effort is normal.      Breath sounds: Normal breath sounds.   Abdominal:      General: Abdomen is flat. Bowel sounds are normal.      Palpations: Abdomen is soft.   Musculoskeletal:         General: Normal range of motion.      Cervical back: Normal range of motion and neck supple.        Back:    Skin:     General: Skin is warm and dry.      Capillary Refill: Capillary refill takes 2 to 3 seconds.   Neurological:      General: No focal deficit present.      Mental Status: He is alert and oriented to person, place, and time.      Motor: Weakness present.   Psychiatric:         Mood and Affect: Mood normal.         Behavior: Behavior  "normal.         Thought Content: Thought content normal.         Judgment: Judgment normal.             Significant Labs: All pertinent labs within the past 24 hours have been reviewed.  POCT Glucose: No results for input(s): "POCTGLUCOSE" in the last 48 hours.    Significant Imaging: I have reviewed all pertinent imaging results/findings within the past 24 hours.  CXR: I have reviewed all pertinent results/findings within the past 24 hours and my personal findings are:  Enlarged cardiomediastinal silhouette    Assessment/Plan:      No notes have been filed under this hospital service.  Service: Hospital Medicine    VTE Risk Mitigation (From admission, onward)           Ordered     IP VTE HIGH RISK PATIENT  Once         06/03/24 1405     Place sequential compression device  Until discontinued         06/03/24 1405                    Discharge Planning   JONNIE:      Code Status: Full Code   Is the patient medically ready for discharge?:     Reason for patient still in hospital (select all that apply): Patient trending condition, Laboratory test, Treatment, and PT / OT recommendations  Discharge Plan A: Home Health                LEWIS Sullivan  Department of Hospital Medicine   [facility]    "

## 2024-06-04 NOTE — ASSESSMENT & PLAN NOTE
"Patient's FSGs are controlled on current medication regimen.  Last A1c reviewed-   Lab Results   Component Value Date    HGBA1C 7.1 (H) 05/21/2024     Most recent fingerstick glucose reviewed- No results for input(s): "POCTGLUCOSE" in the last 24 hours.  Current correctional scale  Low  Maintain anti-hyperglycemic dose as follows-   Antihyperglycemics (From admission, onward)      Start     Stop Route Frequency Ordered    06/03/24 1610  insulin aspart U-100 injection 0-5 Units         -- SubQ Before meals & nightly PRN 06/03/24 1612          Hold Oral hypoglycemics while patient is in the hospital.  "

## 2024-06-04 NOTE — PLAN OF CARE
Problem: Breathing Pattern Ineffective  Goal: Effective Breathing Pattern  Outcome: Progressing

## 2024-06-04 NOTE — PT/OT/SLP PROGRESS
Occupational Therapy   Treatment    Name: Mo Madera  MRN: [id number]  Admitting Diagnosis:  Generalized weakness       Recommendations:     Discharge Recommendations: Low Intensity Therapy  Discharge Equipment Recommendations:  none  Barriers to discharge:  Decreased caregiver support    Assessment:     Mo Madera is a 74 y.o. male with a medical diagnosis of Generalized weakness.  He presents with readiness to begin therapy. Performance deficits affecting function are weakness, impaired endurance, impaired self care skills, impaired functional mobility, gait instability, impaired balance, decreased safety awareness, pain, impaired skin.     Rehab Prognosis:  Good; patient would benefit from acute skilled OT services to address these deficits and reach maximum level of function.       Plan:     Patient to be seen 5 x/week to address the above listed problems via self-care/home management, community/work re-entry, therapeutic activities, therapeutic exercises, neuromuscular re-education, wheelchair management/training  Plan of Care Expires: 06/28/24  Plan of Care Reviewed with: patient    Subjective     Chief Complaint: weakness, soreness from back surgery  Patient/Family Comments/goals: home alone with intermittent svn from family as needed  Pain/Comfort:       Objective:     Communicated with: pt prior to session.  Patient found up in chair with  (no lines or drains) upon OT entry to room.    General Precautions: Standard, fall, hearing impaired    Orthopedic Precautions:Full weight bearing  Braces: N/A  Respiratory Status: Room air     Occupational Performance:     Activities of Daily Living:  Lower Body Dressing: distributing sock aid, reacher, long handled shoe spoon, long handled sponge, and dressing stick  OT provided instruction and demostration and provided pt with return demonstration opportunity.  He required min a with all equipment today but was encouraged to use daily as practice will improve over time  his effectiveness with items.  Pt agreeable.      [facility] 6 Click ADL:      Treatment & Education:  To improve endurance, strength, OT facilitated pt with:  UBE x 5 min, then 2 min with 1 RB's throughout, low level resist    To improve reach, AROM, FM/ and trunk rotation with core forward leaning engagement:   OT initiated pt with nuts and bolts board matching activity, pt agreeable to complete, nonweighted today        Patient left up in chair with call button in reach and chair alarm on    GOALS:   Multidisciplinary Problems       Occupational Therapy Goals          Problem: Occupational Therapy    Goal Priority Disciplines Outcome Interventions   Occupational Therapy Goal     OT, PT/OT Progressing    Description: STG: within 2 weeks  Pt will perform grooming with setup at sinkside  Pt will bathe with mod I with AE as needed from sinkside  Pt will perform UE dressing with SVN after setup  Pt will perform LE dressing with SBA with AE as needed at bedside  Pt will sit EOB x 15 min with SVN assistance  Pt will transfer bed/chair/bsc with SBA  Pt will perform standing task x 5 min with SVN assistance  Pt will tolerate 45 minutes of tx without fatigue      LT.Restore to max I with self care and mobility.                         Time Tracking:     OT Date of Treatment: 24  OT Start Time: 48  OT Stop Time: 1028  OT Total Time (min): 40 min    Billable Minutes:Self Care/Home Management 15  Therapeutic Activity 15  Therapeutic Exercise 10    OT/ALYSSA: OT          2024

## 2024-06-04 NOTE — HPI
" Patient is a 74 y.o. white male with PMHx of DM, HTN, and COPD with a medical diagnosis of s/p lumbar laminectomy performed at [facility].  Per RT patient presents with "I know I have to get up and move to get better". Performance deficits affecting function: weakness, impaired endurance, impaired self care skills, impaired functional mobility, gait instability, impaired balance, decreased safety awareness, pain, impaired skin.   "

## 2024-06-04 NOTE — HOSPITAL COURSE
6/03 Patient supine in bed with eyes open in NAD. Patient current pain level 5/10. He states pain not too severe until he tries to move. Midline incision lower back with no drainage or redness of surrounding soft tissue noted. Wound edges well-approximated with staples intact. Patient states he was seen by PT on admission and scheduled to began PT tomorrow. He expresses understanding that PT will be uncomfortable by knows it is necessary to return to normal ADLs.    6/5 1715 RN called to report poc Glucose 526, levemir was ordered to start tonight, encouraged RN to give now and continue SSI as ordered.  Will continue to monitor.     6/5 1959 RN called to report Glucose remains 527 after receiving levemir and SSI at 1730, reports has eaten food that family brought.  Also noted that Pt is receiving prednisone.  Will check BMP and continue to monitor. Na 128.  Pt is unsure why he takes Lasix.    Will tx with IV fluids tonight and recheck in the AM.  Will tx BS with SSI tonight but may need to adjust levemir dose tomorrow.      6/10 DC home today.  Did well with therapy had follow up at Dr cleaning

## 2024-06-04 NOTE — H&P
"  [facility] Medicine  History & Physical    Patient Name: Mo Madera  MRN: [id number]  Patient Class: IP- Swing  Admission Date: 6/3/2024  Attending Physician: Carlos Perez DO   Primary Care Provider: Lesley Henry FNP         Patient information was obtained from past medical records and ER records.     Subjective:     Principal Problem:Generalized weakness    Chief Complaint: No chief complaint on file.       HPI:  Patient is a 74 y.o. white male with PMHx of DM, HTN, and COPD with a medical diagnosis of s/p lumbar laminectomy performed at [facility].  Per RT patient presents with "I know I have to get up and move to get better". Performance deficits affecting function: weakness, impaired endurance, impaired self care skills, impaired functional mobility, gait instability, impaired balance, decreased safety awareness, pain, impaired skin.     Past Medical History:   Diagnosis Date    COPD (chronic obstructive pulmonary disease)     Diabetes mellitus     Hypertension        Past Surgical History:   Procedure Laterality Date    BACK SURGERY         Review of patient's allergies indicates:  No Known Allergies    Current Facility-Administered Medications on File Prior to Encounter   Medication    [DISCONTINUED] GENERIC EXTERNAL MEDICATION     Current Outpatient Medications on File Prior to Encounter   Medication Sig    carvediloL (COREG) 12.5 MG tablet Take 12.5 mg by mouth 2 (two) times daily with meals.    gabapentin (NEURONTIN) 300 MG capsule Take 300 mg by mouth 2 (two) times daily.    HYDROcodone-acetaminophen (NORCO) 5-325 mg per tablet Take 1 tablet by mouth every 12 (twelve) hours as needed for Pain (Take one tablet by mouth once or twice dly as needed for pain).    hydrOXYzine HCL (ATARAX) 25 MG tablet Take 25 mg by mouth 3 (three) times daily as needed for Itching.    metFORMIN (GLUCOPHAGE) 1000 MG tablet Take 1,000 mg by mouth 2 (two) times daily with " meals.    predniSONE (DELTASONE) 20 MG tablet Take 20 mg by mouth once daily.    spironolactone (ALDACTONE) 25 MG tablet Take 25 mg by mouth once daily.    tamsulosin (FLOMAX) 0.4 mg Cap Take 0.4 mg by mouth once daily.    zolpidem (AMBIEN) 10 mg Tab Take 10 mg by mouth nightly as needed.     Family History    None       Tobacco Use    Smoking status: Some Days     Types: Cigarettes    Smokeless tobacco: Never   Substance and Sexual Activity    Alcohol use: Not on file    Drug use: Not Currently    Sexual activity: Not Currently     Partners: Male     Birth control/protection: None     Review of Systems   Constitutional:  Negative for appetite change, chills and fever.   Respiratory:  Negative for cough, shortness of breath and wheezing.    Cardiovascular:  Negative for chest pain, palpitations and leg swelling.   Gastrointestinal:  Negative for abdominal distention, diarrhea, nausea and vomiting.   Genitourinary:  Negative for dysuria.   Musculoskeletal:  Positive for arthralgias and back pain.   Skin:  Negative for rash.   Neurological:  Positive for weakness. Negative for dizziness, seizures and syncope.   Psychiatric/Behavioral:  Negative for agitation, behavioral problems and confusion.    All other systems reviewed and are negative.    Objective:     Vital Signs (Most Recent):  Temp: 98.9 °F (37.2 °C) (06/04/24 0715)  Pulse: 88 (06/04/24 0856)  Resp: 20 (06/04/24 0857)  BP: (!) 146/69 (06/04/24 0856)  SpO2: 100 % (06/04/24 0749) Vital Signs (24h Range):  Temp:  [97.8 °F (36.6 °C)-98.9 °F (37.2 °C)] 98.9 °F (37.2 °C)  Pulse:  [73-97] 88  Resp:  [18-20] 20  SpO2:  [93 %-100 %] 100 %  BP: (103-146)/(66-72) 146/69     Weight: 98.4 kg (217 lb)  Body mass index is 41 kg/m².     Physical Exam  Vitals reviewed.   Constitutional:       General: He is not in acute distress.     Appearance: Normal appearance.   HENT:      Head: Normocephalic and atraumatic.   Eyes:      General: No scleral icterus.     Extraocular  Movements: Extraocular movements intact.      Conjunctiva/sclera: Conjunctivae normal.      Pupils: Pupils are equal, round, and reactive to light.   Cardiovascular:      Rate and Rhythm: Normal rate and regular rhythm.      Heart sounds: No murmur heard.     No friction rub. No gallop.   Pulmonary:      Effort: Pulmonary effort is normal. No respiratory distress.      Breath sounds: Normal breath sounds. No wheezing or rales.   Abdominal:      General: Abdomen is flat. Bowel sounds are normal. There is no distension.      Palpations: Abdomen is soft.      Tenderness: There is no abdominal tenderness. There is no guarding.   Musculoskeletal:         General: No swelling.   Skin:     General: Skin is warm and dry.      Coloration: Skin is not jaundiced.      Findings: No rash.   Neurological:      General: No focal deficit present.      Mental Status: He is alert and oriented to person, place, and time.      Sensory: No sensory deficit.      Motor: Weakness present.      Gait: Gait abnormal.   Psychiatric:         Mood and Affect: Mood normal.         Thought Content: Thought content normal.         Judgment: Judgment normal.              CRANIAL NERVES     CN III, IV, VI   Pupils are equal, round, and reactive to light.       Significant Labs: All pertinent labs within the past 24 hours have been reviewed.  Recent Lab Results  (Last 5 results in the past 24 hours)        06/04/24  1103   06/04/24  0520   06/04/24  0510   06/03/24  1953   06/03/24  1644        Anion Gap   10             Baso #   0.02             Basophil %   0.3             BUN   20             BUN/CREAT RATIO   18             Calcium   8.4             Chloride   97             CO2   30             Creatinine   1.14             Differential Method   Scan Smear             eGFR   68             Eos #   0.14             Eos %   2.1             Glucose   154             Hematocrit   27.0             Hemoglobin   8.7             Lymph #   1.70           "   Lymph %   25.8             MCH   29.2             MCHC   32.2             MCV   90.6             Mono #   1.14             Mono %   17.3             MPV   8.8             Neutrophils, Abs   3.58             Neutrophils Relative   54.5             Platelet Count   306             POC Glucose 297     156   247   187       Potassium   3.8             RBC   2.98             RDW   12.4             Sodium   133             WBC   6.58                                    Significant Imaging: I have reviewed all pertinent imaging results/findings within the past 24 hours.  Assessment/Plan:     * Generalized weakness  PT/OT, motivated       DM2 (diabetes mellitus, type 2)  Patient's FSGs are controlled on current medication regimen.  Last A1c reviewed-   Lab Results   Component Value Date    HGBA1C 7.1 (H) 05/21/2024     Most recent fingerstick glucose reviewed- No results for input(s): "POCTGLUCOSE" in the last 24 hours.  Current correctional scale  Low  Maintain anti-hyperglycemic dose as follows-   Antihyperglycemics (From admission, onward)      Start     Stop Route Frequency Ordered    06/03/24 1610  insulin aspart U-100 injection 0-5 Units         -- SubQ Before meals & nightly PRN 06/03/24 1612          Hold Oral hypoglycemics while patient is in the hospital.    S/P lumbar laminectomy  Aggressive PT/OT        VTE Risk Mitigation (From admission, onward)           Ordered     IP VTE HIGH RISK PATIENT  Once         06/03/24 1405     Place sequential compression device  Until discontinued         06/03/24 1405                               [facility] Medicine  Progress Note    Patient Name: Mo Madera  MRN: [id number]  Patient Class: IP- Swing   Admission Date: 6/3/2024  Length of Stay: 0 days  Attending Physician: Carlos Perez DO  Primary Care Provider: Lesley Henry FNP        Subjective:     Principal Problem:Generalized weakness        HPI:   Patient is a 74 y.o. " "white male with PMHx of DM, HTN, and COPD with a medical diagnosis of s/p lumbar laminectomy performed at [facility].  Per RT patient presents with "I know I have to get up and move to get better". Performance deficits affecting function: weakness, impaired endurance, impaired self care skills, impaired functional mobility, gait instability, impaired balance, decreased safety awareness, pain, impaired skin.     Overview/Hospital Course:  6/03 Patient supine in bed with eyes open in NAD. Patient current pain level 5/10. He states pain not too severe until he tries to move. Midline incision lower back with no drainage or redness of surrounding soft tissue noted. Wound edges well-approximated with staples intact. Patient states he was seen by PT on admission and scheduled to began PT tomorrow. He expresses understanding that PT will be uncomfortable by knows it is necessary to return to normal ADLs.    Review of Systems   Constitutional:  Negative for chills and fever.   Respiratory: Negative.  Negative for apnea, cough, choking, chest tightness, shortness of breath, wheezing and stridor.    Cardiovascular: Negative.  Negative for chest pain, palpitations and leg swelling.   Gastrointestinal: Negative.  Negative for abdominal distention and abdominal pain.   Genitourinary: Negative.    Musculoskeletal:  Positive for back pain.   Skin:  Positive for wound.        Surgical wound lumbar spine   Neurological:  Positive for weakness. Negative for dizziness, tremors, seizures, syncope, facial asymmetry, speech difficulty, light-headedness, numbness and headaches.   Psychiatric/Behavioral: Negative.     All other systems reviewed and are negative.    Objective:     Vital Signs (Most Recent):  Temp: 98.8 °F (37.1 °C) (06/03/24 1929)  Pulse: 88 (06/03/24 1929)  Resp: 20 (06/03/24 1929)  BP: 129/72 (06/03/24 1929)  SpO2: (!) 94 % (06/03/24 1929) Vital Signs (24h Range):  Temp:  [97.8 °F (36.6 °C)-98.8 °F (37.1 °C)] 98.8 °F " "(37.1 °C)  Pulse:  [83-88] 88  Resp:  [18-20] 20  SpO2:  [94 %-100 %] 94 %  BP: (129-138)/(66-72) 129/72     Weight: 98.4 kg (217 lb)  Body mass index is 41 kg/m².    Intake/Output Summary (Last 24 hours) at 6/3/2024 2015  Last data filed at 6/3/2024 1850  Gross per 24 hour   Intake 120 ml   Output 900 ml   Net -780 ml         Physical Exam  Vitals and nursing note reviewed.   Constitutional:       General: He is not in acute distress.     Appearance: Normal appearance. He is normal weight. He is not ill-appearing.   HENT:      Mouth/Throat:      Mouth: Mucous membranes are moist.   Eyes:      Extraocular Movements: Extraocular movements intact.      Conjunctiva/sclera: Conjunctivae normal.   Cardiovascular:      Rate and Rhythm: Normal rate and regular rhythm.      Pulses: Normal pulses.      Heart sounds: Normal heart sounds. No murmur heard.     No friction rub. No gallop.   Pulmonary:      Effort: Pulmonary effort is normal.      Breath sounds: Normal breath sounds.   Abdominal:      General: Abdomen is flat. Bowel sounds are normal.      Palpations: Abdomen is soft.   Musculoskeletal:         General: Normal range of motion.      Cervical back: Normal range of motion and neck supple.        Back:    Skin:     General: Skin is warm and dry.      Capillary Refill: Capillary refill takes 2 to 3 seconds.   Neurological:      General: No focal deficit present.      Mental Status: He is alert and oriented to person, place, and time.      Motor: Weakness present.   Psychiatric:         Mood and Affect: Mood normal.         Behavior: Behavior normal.         Thought Content: Thought content normal.         Judgment: Judgment normal.             Significant Labs: All pertinent labs within the past 24 hours have been reviewed.  POCT Glucose: No results for input(s): "POCTGLUCOSE" in the last 48 hours.    Significant Imaging: I have reviewed all pertinent imaging results/findings within the past 24 hours.  CXR: I have " reviewed all pertinent results/findings within the past 24 hours and my personal findings are:  Enlarged cardiomediastinal silhouette    Assessment/Plan:      No notes have been filed under this hospital service.  Service: Hospital Medicine    VTE Risk Mitigation (From admission, onward)           Ordered     IP VTE HIGH RISK PATIENT  Once         06/03/24 1405     Place sequential compression device  Until discontinued         06/03/24 1405                    Discharge Planning   JONNIE:      Code Status: Full Code   Is the patient medically ready for discharge?:     Reason for patient still in hospital (select all that apply): Patient trending condition, Laboratory test, Treatment, and PT / OT recommendations  Discharge Plan A: Home Health                LEWIS Sullivan  Department of Hospital Medicine   [facility]      Carlos Perez DO  Department of Hospital Medicine  [facility]

## 2024-06-04 NOTE — PLAN OF CARE
Problem: Adult Inpatient Plan of Care  Goal: Absence of Hospital-Acquired Illness or Injury  Outcome: Progressing  Goal: Optimal Comfort and Wellbeing  Outcome: Progressing     Problem: Fall Injury Risk  Goal: Absence of Fall and Fall-Related Injury  Outcome: Progressing

## 2024-06-04 NOTE — SUBJECTIVE & OBJECTIVE
Review of Systems   Constitutional:  Negative for chills and fever.   Respiratory: Negative.  Negative for apnea, cough, choking, chest tightness, shortness of breath, wheezing and stridor.    Cardiovascular: Negative.  Negative for chest pain, palpitations and leg swelling.   Gastrointestinal: Negative.  Negative for abdominal distention and abdominal pain.   Genitourinary: Negative.    Musculoskeletal:  Positive for back pain.   Skin:  Positive for wound.        Surgical wound lumbar spine   Neurological:  Positive for weakness. Negative for dizziness, tremors, seizures, syncope, facial asymmetry, speech difficulty, light-headedness, numbness and headaches.   Psychiatric/Behavioral: Negative.     All other systems reviewed and are negative.    Objective:     Vital Signs (Most Recent):  Temp: 98.8 °F (37.1 °C) (06/03/24 1929)  Pulse: 88 (06/03/24 1929)  Resp: 20 (06/03/24 1929)  BP: 129/72 (06/03/24 1929)  SpO2: (!) 94 % (06/03/24 1929) Vital Signs (24h Range):  Temp:  [97.8 °F (36.6 °C)-98.8 °F (37.1 °C)] 98.8 °F (37.1 °C)  Pulse:  [83-88] 88  Resp:  [18-20] 20  SpO2:  [94 %-100 %] 94 %  BP: (129-138)/(66-72) 129/72     Weight: 98.4 kg (217 lb)  Body mass index is 41 kg/m².    Intake/Output Summary (Last 24 hours) at 6/3/2024 2015  Last data filed at 6/3/2024 1850  Gross per 24 hour   Intake 120 ml   Output 900 ml   Net -780 ml         Physical Exam  Vitals and nursing note reviewed.   Constitutional:       General: He is not in acute distress.     Appearance: Normal appearance. He is normal weight. He is not ill-appearing.   HENT:      Mouth/Throat:      Mouth: Mucous membranes are moist.   Eyes:      Extraocular Movements: Extraocular movements intact.      Conjunctiva/sclera: Conjunctivae normal.   Cardiovascular:      Rate and Rhythm: Normal rate and regular rhythm.      Pulses: Normal pulses.      Heart sounds: Normal heart sounds. No murmur heard.     No friction rub. No gallop.   Pulmonary:      Effort:  "Pulmonary effort is normal.      Breath sounds: Normal breath sounds.   Abdominal:      General: Abdomen is flat. Bowel sounds are normal.      Palpations: Abdomen is soft.   Musculoskeletal:         General: Normal range of motion.      Cervical back: Normal range of motion and neck supple.        Back:    Skin:     General: Skin is warm and dry.      Capillary Refill: Capillary refill takes 2 to 3 seconds.   Neurological:      General: No focal deficit present.      Mental Status: He is alert and oriented to person, place, and time.      Motor: Weakness present.   Psychiatric:         Mood and Affect: Mood normal.         Behavior: Behavior normal.         Thought Content: Thought content normal.         Judgment: Judgment normal.             Significant Labs: All pertinent labs within the past 24 hours have been reviewed.  POCT Glucose: No results for input(s): "POCTGLUCOSE" in the last 48 hours.    Significant Imaging: I have reviewed all pertinent imaging results/findings within the past 24 hours.  CXR: I have reviewed all pertinent results/findings within the past 24 hours and my personal findings are:  Enlarged cardiomediastinal silhouette  "

## 2024-06-04 NOTE — SUBJECTIVE & OBJECTIVE
Past Medical History:   Diagnosis Date    COPD (chronic obstructive pulmonary disease)     Diabetes mellitus     Hypertension        Past Surgical History:   Procedure Laterality Date    BACK SURGERY         Review of patient's allergies indicates:  No Known Allergies    Current Facility-Administered Medications on File Prior to Encounter   Medication    [DISCONTINUED] GENERIC EXTERNAL MEDICATION     Current Outpatient Medications on File Prior to Encounter   Medication Sig    carvediloL (COREG) 12.5 MG tablet Take 12.5 mg by mouth 2 (two) times daily with meals.    gabapentin (NEURONTIN) 300 MG capsule Take 300 mg by mouth 2 (two) times daily.    HYDROcodone-acetaminophen (NORCO) 5-325 mg per tablet Take 1 tablet by mouth every 12 (twelve) hours as needed for Pain (Take one tablet by mouth once or twice dly as needed for pain).    hydrOXYzine HCL (ATARAX) 25 MG tablet Take 25 mg by mouth 3 (three) times daily as needed for Itching.    metFORMIN (GLUCOPHAGE) 1000 MG tablet Take 1,000 mg by mouth 2 (two) times daily with meals.    predniSONE (DELTASONE) 20 MG tablet Take 20 mg by mouth once daily.    spironolactone (ALDACTONE) 25 MG tablet Take 25 mg by mouth once daily.    tamsulosin (FLOMAX) 0.4 mg Cap Take 0.4 mg by mouth once daily.    zolpidem (AMBIEN) 10 mg Tab Take 10 mg by mouth nightly as needed.     Family History    None       Tobacco Use    Smoking status: Some Days     Types: Cigarettes    Smokeless tobacco: Never   Substance and Sexual Activity    Alcohol use: Not on file    Drug use: Not Currently    Sexual activity: Not Currently     Partners: Male     Birth control/protection: None     Review of Systems   Constitutional:  Negative for appetite change, chills and fever.   Respiratory:  Negative for cough, shortness of breath and wheezing.    Cardiovascular:  Negative for chest pain, palpitations and leg swelling.   Gastrointestinal:  Negative for abdominal distention, diarrhea, nausea and vomiting.    Genitourinary:  Negative for dysuria.   Musculoskeletal:  Positive for arthralgias and back pain.   Skin:  Negative for rash.   Neurological:  Positive for weakness. Negative for dizziness, seizures and syncope.   Psychiatric/Behavioral:  Negative for agitation, behavioral problems and confusion.    All other systems reviewed and are negative.    Objective:     Vital Signs (Most Recent):  Temp: 98.9 °F (37.2 °C) (06/04/24 0715)  Pulse: 88 (06/04/24 0856)  Resp: 20 (06/04/24 0857)  BP: (!) 146/69 (06/04/24 0856)  SpO2: 100 % (06/04/24 0749) Vital Signs (24h Range):  Temp:  [97.8 °F (36.6 °C)-98.9 °F (37.2 °C)] 98.9 °F (37.2 °C)  Pulse:  [73-97] 88  Resp:  [18-20] 20  SpO2:  [93 %-100 %] 100 %  BP: (103-146)/(66-72) 146/69     Weight: 98.4 kg (217 lb)  Body mass index is 41 kg/m².     Physical Exam  Vitals reviewed.   Constitutional:       General: He is not in acute distress.     Appearance: Normal appearance.   HENT:      Head: Normocephalic and atraumatic.   Eyes:      General: No scleral icterus.     Extraocular Movements: Extraocular movements intact.      Conjunctiva/sclera: Conjunctivae normal.      Pupils: Pupils are equal, round, and reactive to light.   Cardiovascular:      Rate and Rhythm: Normal rate and regular rhythm.      Heart sounds: No murmur heard.     No friction rub. No gallop.   Pulmonary:      Effort: Pulmonary effort is normal. No respiratory distress.      Breath sounds: Normal breath sounds. No wheezing or rales.   Abdominal:      General: Abdomen is flat. Bowel sounds are normal. There is no distension.      Palpations: Abdomen is soft.      Tenderness: There is no abdominal tenderness. There is no guarding.   Musculoskeletal:         General: No swelling.   Skin:     General: Skin is warm and dry.      Coloration: Skin is not jaundiced.      Findings: No rash.   Neurological:      General: No focal deficit present.      Mental Status: He is alert and oriented to person, place, and time.       Sensory: No sensory deficit.      Motor: Weakness present.      Gait: Gait abnormal.   Psychiatric:         Mood and Affect: Mood normal.         Thought Content: Thought content normal.         Judgment: Judgment normal.              CRANIAL NERVES     CN III, IV, VI   Pupils are equal, round, and reactive to light.       Significant Labs: All pertinent labs within the past 24 hours have been reviewed.  Recent Lab Results  (Last 5 results in the past 24 hours)        06/04/24  1103   06/04/24  0520   06/04/24  0510   06/03/24  1953   06/03/24  1644        Anion Gap   10             Baso #   0.02             Basophil %   0.3             BUN   20             BUN/CREAT RATIO   18             Calcium   8.4             Chloride   97             CO2   30             Creatinine   1.14             Differential Method   Scan Smear             eGFR   68             Eos #   0.14             Eos %   2.1             Glucose   154             Hematocrit   27.0             Hemoglobin   8.7             Lymph #   1.70             Lymph %   25.8             MCH   29.2             MCHC   32.2             MCV   90.6             Mono #   1.14             Mono %   17.3             MPV   8.8             Neutrophils, Abs   3.58             Neutrophils Relative   54.5             Platelet Count   306             POC Glucose 297     156   247   187       Potassium   3.8             RBC   2.98             RDW   12.4             Sodium   133             WBC   6.58                                    Significant Imaging: I have reviewed all pertinent imaging results/findings within the past 24 hours.

## 2024-06-04 NOTE — PROGRESS NOTES
Clinical Pharmacy Chart Review Note      Admit Date: 6/3/2024   LOS: 1 day       Mo Madera is a 74 y.o. male admitted to [facility] for PT/OT after hospitalization for lumbar laminectomy.    Active Hospital Problems    Diagnosis  POA    *Generalized weakness [R53.1]  Yes     Rehabilitation per PT  Close Monitoring      DM2 (diabetes mellitus, type 2) [E11.9]  Yes    Surgical wound present [T14.8XXA]  Yes     1.Antibiotic therapy  2. Routine dressing changes  3. Diabetic diet      S/P lumbar laminectomy [Z98.890]  Not Applicable     Rehab per PT  Antibiotic therapy        Resolved Hospital Problems   No resolved problems to display.     Review of patient's allergies indicates:  No Known Allergies  Patient Active Problem List    Diagnosis Date Noted    DM2 (diabetes mellitus, type 2) 06/04/2024    Generalized weakness 06/03/2024    Surgical wound present 06/03/2024    S/P lumbar laminectomy 06/03/2024       Scheduled Meds:    albuterol-ipratropium  3 mL Nebulization Q6H    allopurinoL  300 mg Oral Daily    amoxicillin-clavulanate 875-125mg  1 tablet Oral Q12H    ascorbic acid (vitamin C)  500 mg Oral Daily    aspirin  81 mg Oral Daily    atorvastatin  40 mg Oral QHS    budesonide  0.5 mg Nebulization Q12H    carvediloL  12.5 mg Oral BID WM    ferrous sulfate  1 tablet Oral Daily    furosemide  40 mg Oral Daily    gabapentin  300 mg Oral BID    hydrALAZINE  100 mg Oral Q12H    magnesium oxide  400 mg Oral TID    multivitamin  1 tablet Oral Daily    predniSONE  40 mg Oral Daily    spironolactone  25 mg Oral Daily    tamsulosin  0.4 mg Oral Daily    vitamin D  1,000 Units Oral Daily     Continuous Infusions:   PRN Meds:   Current Facility-Administered Medications:     acetaminophen, 650 mg, Oral, Q6H PRN    albuterol sulfate, 2.5 mg, Nebulization, Q4H PRN    calcium carbonate, 500 mg, Oral, BID PRN    dextrose 50%, 12.5 g, Intravenous, PRN    dextrose 50%, 25 g, Intravenous, PRN    glucagon (human recombinant), 1 mg,  Intramuscular, PRN    glucose, 16 g, Oral, PRN    glucose, 24 g, Oral, PRN    HYDROcodone-acetaminophen, 1 tablet, Oral, Q6H PRN    hydrOXYzine pamoate, 25 mg, Oral, Q8H PRN    insulin aspart U-100, 0-5 Units, Subcutaneous, QID (AC + HS) PRN    methocarbamoL, 500 mg, Oral, Q6H PRN    nitroGLYCERIN, 0.4 mg, Sublingual, Q5 Min PRN    senna-docusate 8.6-50 mg, 1 tablet, Oral, BID PRN    zolpidem, 10 mg, Oral, Nightly PRN    OBJECTIVE:     Vital Signs (Last 24H)  Temp:  [97.8 °F (36.6 °C)-98.9 °F (37.2 °C)]   Pulse:  [73-97]   Resp:  [18-20]   BP: (103-146)/(66-72)   SpO2:  [93 %-100 %]     Laboratory:  CBC:   Recent Labs   Lab 06/04/24  0520   WBC 6.58   RBC 2.98*   HGB 8.7*   HCT 27.0*      MCV 90.6   MCH 29.2   MCHC 32.2     BMP:   Recent Labs   Lab 06/01/24  1102 06/02/24  0442 06/04/24  0520   GLU  --   --  154*   * 131* 133*   K 4.1 3.9 3.8    100 97*   CO2 20* 22 30   BUN 27* 31* 20*   CREATININE 1.19* 1.34* 1.14   CALCIUM 8.1* 8.0* 8.4*     .    ASSESSMENT/PLAN:     Estimated Creatinine Clearance: 57.7 mL/min (based on SCr of 1.14 mg/dL).Medications reviewed, no dose adjustments needed. Will likely need basal insulin, continuing to monitor. Weekly swing bed medication regimen review complete.  Will continue to monitor.  Ken Paulino, Pharm. D.  Director of Pharmacy  [facility]  [phone]  [email or web address]    Sig

## 2024-06-04 NOTE — PT/OT/SLP EVAL
Physical Therapy Evaluation    Patient Name:  Mo Madera   MRN:  [id number]    Recommendations:     Discharge Recommendations: Moderate Intensity Therapy   Discharge Equipment Recommendations: grab bar   Barriers to discharge: Inaccessible home, Decreased caregiver support, and high falls risk    Assessment:     Mo Madera is a 74 y.o. male admitted with a medical diagnosis of Generalized weakness.  He presents with the following impairments/functional limitations: weakness, impaired functional mobility, gait instability, impaired balance, impaired self care skills, decreased lower extremity function, pain, orthopedic precautions .    Pt had L4-5 decompressive laminectomy, ulices facetectomies and posterior fusion. His left knee tried to buckle during ambulation at 5 ft and he had to sit down. He requires max Ax1 to come to stand. PT was unable to assess bed mob d/t severe pain of 10/10 after transfer and ambulation, but CNA stated that it took extensive assist of 2 persons.     Rehab Prognosis: Good; patient would benefit from acute skilled PT services to address these deficits and reach maximum level of function.    Recent Surgery: * No surgery found *      Plan:     During this hospitalization, patient to be seen 5 x/week to address the identified rehab impairments via gait training, therapeutic activities, therapeutic exercises, neuromuscular re-education and progress toward the following goals:    Plan of Care Expires:  06/28/24    Subjective     Chief Complaint: Pt states that his back pain began to increase when he began standing up from wheelchair. He c/o pt extending to left ant thigh to the knee. He has a personal goal of being able to walk the 100-125 ft to his mailbox.  Patient/Family Comments/goals: Pt plans to dc home with family and friend to help.  Pain/Comfort:  Pain Rating 1: 9/10  Location - Side 1: Left  Location - Orientation 1: midline (pain extends from back to left ant thigh to the  "knee)  Location 1: back (left ant thigh)  Pain Addressed 1: Pre-medicate for activity, Cessation of Activity, Reposition  Pain Rating Post-Intervention 1: 5/10    Patients cultural, spiritual, Restoration conflicts given the current situation: no    Living Environment:  Pt lives alone in a trailer home. He has both steps (3-8", single rail) and a ramp. He has a nephew that lives behind him and a granddtr living nearing who both check on him.  Prior to admission, patients level of function was modif indep using WBQC or furniture walking indoor and used RW in community..  Equipment used at home: cane, quad, walker, rolling, hip kit, shower chair.  DME owned (not currently used):  [employer] .  Upon discharge, patient will have assistance from family and a friend.    Objective:     Communicated with OT/chart review/pt prior to session.  Patient found up in chair with chair check  upon PT entry to room.    General Precautions: Standard, fall, diabetic, hearing impaired  Orthopedic Precautions:spinal precautions   Braces:    Respiratory Status: Room air    Exams:  Cognitive Exam:  Patient is oriented to Person, Place, Time, and Situation  RLE ROM: Deficits: RLE WFL, but back limited  RLE Strength: Deficits: hip ext 3+/5, hip abd 4+/5, knee ext 4/5  LLE ROM: Deficits: LE WFL, but back limited  LLE Strength: Deficits: hip ext 3+/5, hip abd 4+/5, knee ext 4/5    Functional Mobility:  Bed Mobility:     Supine to Sit: maximal assistance, of 2 persons, and per CNA  Sit to Supine: maximal assistance, of 2 persons, and per CNA  Transfers:     Sit to Stand:  maximal assistance and of 1 persons with 4 wheeled walker  Gait: 5 ft with min Ax1 using bariatric rollator, but then LLE began to hurt and left knee buckled      AM-PAC 6 CLICK MOBILITY  Total Score:12       Treatment & Education:  Eval completed. POC discussed. Pt instructed in/performed seated APT/PPT x 5 reps ending with ext, ulices shldr flex with trunk ext x 10 reps each. Pt " received TPR to left pectoral d/t c/o pain during flex which resolved.     Patient left up in chair with all lines intact and chair alarm on.    GOALS:   Multidisciplinary Problems       Physical Therapy Goals          Problem: Physical Therapy    Goal Priority Disciplines Outcome Goal Variances Interventions   Physical Therapy Goal     PT, PT/OT Progressing     Description: STG - 1 week  1. Pt will transfer sit to stand with SBA.  2. Pt will amb 50 ft with CGA with appropriate AD.  3. Pt will transfer supine to/from sit with min Ax1.    LTG - 3 weeks  1. Pt will transfer sit to stand with SVN.  2. Pt will amb 250 ft with SBA with appropriate AD.  3. Pt will negotiate steps with rail with CGA.  4. Pt will negotiate ramp using AD with SBA.  5. Pt will transfer supine to/from sit with SBA.                       History:     Past Medical History:   Diagnosis Date    COPD (chronic obstructive pulmonary disease)     Diabetes mellitus     Hypertension        Past Surgical History:   Procedure Laterality Date    BACK SURGERY         Time Tracking:     PT Received On: 06/04/24  PT Start Time: 1112     PT Stop Time: 1145  PT Total Time (min): 33 min     Billable Minutes: Evaluation 23, Therapeutic Exercise 10, and Total Time 33 minutes      06/04/2024

## 2024-06-05 LAB
ANION GAP SERPL CALCULATED.3IONS-SCNC: 9 MMOL/L (ref 7–16)
BUN SERPL-MCNC: 27 MG/DL (ref 7–18)
BUN/CREAT SERPL: 20 (ref 6–20)
CALCIUM SERPL-MCNC: 8.3 MG/DL (ref 8.5–10.1)
CHLORIDE SERPL-SCNC: 92 MMOL/L (ref 98–107)
CO2 SERPL-SCNC: 32 MMOL/L (ref 21–32)
CREAT SERPL-MCNC: 1.38 MG/DL (ref 0.7–1.3)
EGFR (NO RACE VARIABLE) (RUSH/TITUS): 54 ML/MIN/1.73M2
GLUCOSE SERPL-MCNC: 360 MG/DL (ref 70–105)
GLUCOSE SERPL-MCNC: 361 MG/DL (ref 70–105)
GLUCOSE SERPL-MCNC: 511 MG/DL (ref 74–106)
POTASSIUM SERPL-SCNC: 4.7 MMOL/L (ref 3.5–5.1)
SODIUM SERPL-SCNC: 128 MMOL/L (ref 136–145)

## 2024-06-05 PROCEDURE — 97110 THERAPEUTIC EXERCISES: CPT

## 2024-06-05 PROCEDURE — 97116 GAIT TRAINING THERAPY: CPT

## 2024-06-05 PROCEDURE — 25000003 PHARM REV CODE 250: Performed by: NURSE PRACTITIONER

## 2024-06-05 PROCEDURE — 27000987 HC MATTRESS, MATRIX LOW PROFILE

## 2024-06-05 PROCEDURE — 36415 COLL VENOUS BLD VENIPUNCTURE: CPT | Performed by: NURSE PRACTITIONER

## 2024-06-05 PROCEDURE — 80048 BASIC METABOLIC PNL TOTAL CA: CPT | Performed by: NURSE PRACTITIONER

## 2024-06-05 PROCEDURE — 11000004 HC SNF PRIVATE

## 2024-06-05 PROCEDURE — 63600175 PHARM REV CODE 636 W HCPCS: Performed by: NURSE PRACTITIONER

## 2024-06-05 PROCEDURE — 82962 GLUCOSE BLOOD TEST: CPT

## 2024-06-05 PROCEDURE — 25000242 PHARM REV CODE 250 ALT 637 W/ HCPCS: Performed by: NURSE PRACTITIONER

## 2024-06-05 PROCEDURE — 97535 SELF CARE MNGMENT TRAINING: CPT

## 2024-06-05 PROCEDURE — 27000958

## 2024-06-05 PROCEDURE — 94640 AIRWAY INHALATION TREATMENT: CPT

## 2024-06-05 RX ADMIN — IPRATROPIUM BROMIDE AND ALBUTEROL SULFATE 3 ML: 2.5; .5 SOLUTION RESPIRATORY (INHALATION) at 07:06

## 2024-06-05 RX ADMIN — OXYCODONE HYDROCHLORIDE AND ACETAMINOPHEN 500 MG: 500 TABLET ORAL at 09:06

## 2024-06-05 RX ADMIN — Medication 400 MG: at 02:06

## 2024-06-05 RX ADMIN — INSULIN ASPART 5 UNITS: 100 INJECTION, SOLUTION INTRAVENOUS; SUBCUTANEOUS at 11:06

## 2024-06-05 RX ADMIN — AMOXICILLIN AND CLAVULANATE POTASSIUM 1 TABLET: 875; 125 TABLET, FILM COATED ORAL at 09:06

## 2024-06-05 RX ADMIN — CARVEDILOL 12.5 MG: 6.25 TABLET, FILM COATED ORAL at 04:06

## 2024-06-05 RX ADMIN — Medication 400 MG: at 09:06

## 2024-06-05 RX ADMIN — GABAPENTIN 300 MG: 300 CAPSULE ORAL at 08:06

## 2024-06-05 RX ADMIN — HYDRALAZINE HYDROCHLORIDE 100 MG: 25 TABLET ORAL at 09:06

## 2024-06-05 RX ADMIN — INSULIN ASPART 5 UNITS: 100 INJECTION, SOLUTION INTRAVENOUS; SUBCUTANEOUS at 06:06

## 2024-06-05 RX ADMIN — BUDESONIDE INHALATION 0.5 MG: 0.5 SUSPENSION RESPIRATORY (INHALATION) at 07:06

## 2024-06-05 RX ADMIN — HYDRALAZINE HYDROCHLORIDE 100 MG: 25 TABLET ORAL at 08:06

## 2024-06-05 RX ADMIN — FUROSEMIDE 40 MG: 40 TABLET ORAL at 09:06

## 2024-06-05 RX ADMIN — SODIUM CHLORIDE 1000 ML: 9 INJECTION, SOLUTION INTRAVENOUS at 10:06

## 2024-06-05 RX ADMIN — IPRATROPIUM BROMIDE AND ALBUTEROL SULFATE 3 ML: 2.5; .5 SOLUTION RESPIRATORY (INHALATION) at 12:06

## 2024-06-05 RX ADMIN — PREDNISONE 40 MG: 20 TABLET ORAL at 09:06

## 2024-06-05 RX ADMIN — INSULIN ASPART 5 UNITS: 100 INJECTION, SOLUTION INTRAVENOUS; SUBCUTANEOUS at 05:06

## 2024-06-05 RX ADMIN — FERROUS SULFATE TAB 325 MG (65 MG ELEMENTAL FE) 1 EACH: 325 (65 FE) TAB at 09:06

## 2024-06-05 RX ADMIN — Medication 1000 UNITS: at 09:06

## 2024-06-05 RX ADMIN — ATORVASTATIN CALCIUM 40 MG: 40 TABLET, FILM COATED ORAL at 08:06

## 2024-06-05 RX ADMIN — ZOLPIDEM TARTRATE 10 MG: 5 TABLET ORAL at 08:06

## 2024-06-05 RX ADMIN — AMOXICILLIN AND CLAVULANATE POTASSIUM 1 TABLET: 875; 125 TABLET, FILM COATED ORAL at 08:06

## 2024-06-05 RX ADMIN — ALLOPURINOL 300 MG: 300 TABLET ORAL at 09:06

## 2024-06-05 RX ADMIN — THERA TABS 1 TABLET: TAB at 09:06

## 2024-06-05 RX ADMIN — SPIRONOLACTONE 25 MG: 25 TABLET ORAL at 09:06

## 2024-06-05 RX ADMIN — INSULIN DETEMIR 15 UNITS: 100 INJECTION, SOLUTION SUBCUTANEOUS at 05:06

## 2024-06-05 RX ADMIN — TAMSULOSIN HYDROCHLORIDE 0.4 MG: 0.4 CAPSULE ORAL at 09:06

## 2024-06-05 RX ADMIN — INSULIN ASPART 3 UNITS: 100 INJECTION, SOLUTION INTRAVENOUS; SUBCUTANEOUS at 10:06

## 2024-06-05 RX ADMIN — IPRATROPIUM BROMIDE AND ALBUTEROL SULFATE 3 ML: 2.5; .5 SOLUTION RESPIRATORY (INHALATION) at 01:06

## 2024-06-05 RX ADMIN — HYDROCODONE BITARTRATE AND ACETAMINOPHEN 1 TABLET: 10; 325 TABLET ORAL at 09:06

## 2024-06-05 RX ADMIN — CARVEDILOL 12.5 MG: 6.25 TABLET, FILM COATED ORAL at 09:06

## 2024-06-05 RX ADMIN — Medication 400 MG: at 08:06

## 2024-06-05 RX ADMIN — GABAPENTIN 300 MG: 300 CAPSULE ORAL at 09:06

## 2024-06-05 RX ADMIN — ASPIRIN 81 MG CHEWABLE TABLET 81 MG: 81 TABLET CHEWABLE at 09:06

## 2024-06-05 NOTE — NURSING
Blood sugar 526. LOLLY Nickerson notified. Ordered to treat with sliding scale and give 15 units insulin detemir early.

## 2024-06-05 NOTE — PLAN OF CARE
Problem: Breathing Pattern Ineffective  Goal: Effective Breathing Pattern  6/5/2024 0853 by Aparna Helton, RRT  Outcome: Progressing  6/4/2024 2306 by Aparna Helton, RRT  Outcome: Progressing

## 2024-06-05 NOTE — CONSULTS
[facility]  Adult Nutrition  Consult Note         Reason for Assessment  Reason For Assessment: consult assess  Nutrition Risk Screen: no indicators present    Assessment and Plan  Consult received and appreciated. Patient admitted 6/3 with a dx of generalized weakness, s/p lumbar laminectomy and DM. Patient is ordered a 2000 calorie consistent carbohydrate diet. Patient with fair po intakes since admission with 50% intake per flowsheets.    Patient is 98.4 kg with a BMI of 41.00. Labs noted. Diet order appropriate at this time. Recommend to continue diet as tolerated. RD Following.       Learning Needs/Social Determinants of Health  Learning Assessment       06/03/2024 1325 [facility] (6/3/2024 - Present)   Created by Kely Triana RN - RN (Nurse) Status: Complete                 PRIMARY LEARNER     Primary Learner Name:  oM Madera  - 06/03/2024 1325    Relationship:  Patient  - 06/03/2024 1325    Does the primary learner have any barriers to learning?:  Reading  - 06/03/2024 1325    What is the preferred language of the primary learner?:  English [facility] 06/03/2024 1325    Is an  required?:  No [facility] 06/03/2024 1325    How does the primary learner prefer to learn new concepts?:  Listening  - 06/03/2024 1325    How often do you need to have someone help you read instructions, pamphlets, or written material from your doctor or pharmacy?:  Never [facility] 06/03/2024 1325        CO-LEARNER #1     No question answered        CO-LEARNER #2     No question answered        SPECIAL TOPICS     No question answered        ANSWERED BY:     No question answered        Comments         Edit History       Kely Triana, RN - RN (Nurse)   06/03/2024 1325                           Social Determinants of Health     Tobacco Use: High Risk (6/3/2024)    Patient History     Smoking Tobacco Use: Some Days     Smokeless Tobacco Use: Never      Passive Exposure: Not on file   Alcohol Use: Not At Risk (6/3/2024)    AUDIT-C     Frequency of Alcohol Consumption: Never     Average Number of Drinks: Patient does not drink     Frequency of Binge Drinking: Never   Financial Resource Strain: Low Risk  (6/3/2024)    Overall Financial Resource Strain (CARDIA)     Difficulty of Paying Living Expenses: Not hard at all   Food Insecurity: No Food Insecurity (6/3/2024)    Hunger Vital Sign     Worried About Running Out of Food in the Last Year: Never true     Ran Out of Food in the Last Year: Never true   Transportation Needs: No Transportation Needs (6/3/2024)    TRANSPORTATION NEEDS     Transportation : No   Physical Activity: Inactive (6/3/2024)    Exercise Vital Sign     Days of Exercise per Week: 0 days     Minutes of Exercise per Session: 0 min   Stress: No Stress Concern Present (6/3/2024)    [place] [employer] - Occupational Stress Questionnaire     Feeling of Stress : Not at all   Housing Stability: Low Risk  (6/3/2024)    Housing Stability Vital Sign     Unable to Pay for Housing in the Last Year: No     Homeless in the Last Year: No   Depression: Not at risk (9/8/2023)    Received from [facility] Affiliates    PHQ-2     PHQ-2 Total: 0   Utilities: Not At Risk (6/3/2024)    [facility] Utilities     Threatened with loss of utilities: No   Health Literacy: Adequate Health Literacy (6/3/2024)     Health Literacy     Frequency of need for help with medical instructions: Never   Social Isolation: Somewhat Isolated (6/3/2024)    Social Isolation     Social Isolation: 3            Malnutrition  Is Patient Malnourished: No    Nutrition Diagnosis  Overweight related to Excessive Caloric intake as evidenced by BMI 41.00  Comments:     Recent Labs   Lab 06/04/24  0520 06/04/24  1103 06/05/24  0551   *  --   --    POCGLU  --    < > 361*    < > = values in this interval not displayed.      Comments on Glucose: dx DM    Nutrition Prescription / Recommendations  Recommendation/Intervention: Recommend to continue diet as tolerated  Goals: po intake 50-75% during admission  Nutrition Goal Status: new  Current Diet Order: 2000 calorie consistent carbohydrate  Chewing or Swallowing Difficulty?: No Chewing or swallowing difficulty  Recommended Diet: Consistent Carbohydrate 2000 (75g Carbs)  Recommended Oral Supplement: No Oral Supplements  Is Nutrition Support Recommended: [facility] Nutrition Support: No  Is Nutrition Education Recommended: No    Monitor and Evaluation  % current Intake: P.O. intake of 25 - 50 % and P.O. intake of 50 - 75 %  % intake to meet estimated needs: 75 - 100 %  Food and Nutrient Intake: energy intake, food and beverage intake  Food and Nutrient Adminstration: diet order  Anthropometric Measurements: height/length, weight, weight change, body mass index  Biochemical Data, Medical Tests and Procedures: electrolyte and renal panel, gastrointestinal profile, glucose/endocrine profile, inflammatory profile  Tolerance: tolerating    Current Medical Diagnosis and Past Medical History     Past Medical History:   Diagnosis Date    COPD (chronic obstructive pulmonary disease)     Diabetes mellitus     Hypertension        Nutrition/Diet History  Spiritual, Cultural Beliefs, Uatsdin Practices, Values that Affect Care: no  Food Allergies: NKFA  Factors Affecting Nutritional Intake: None identified at this time    Lab/Procedures/Meds  Recent Labs   Lab 06/04/24  0520   *   K 3.8   BUN 20*   CREATININE 1.14   CALCIUM 8.4*   CL 97*   Na low consider replete to WNL  BUN elevated   Last A1c:   Lab Results   Component Value Date    HGBA1C 7.1 (H) 05/21/2024     Lab Results   Component Value Date    RBC 2.98 (L) 06/04/2024    HGB 8.7 (L) 06/04/2024    HCT 27.0 (L) 06/04/2024    MCV 90.6 06/04/2024    MCH 29.2 06/04/2024    MCHC 32.2 06/04/2024     Pertinent Labs Reviewed:  "reviewed  Pertinent Labs Comments: Na 133 Glu 154  Pertinent Medications Reviewed: reviewed  Scheduled Meds:   albuterol-ipratropium  3 mL Nebulization Q6H    allopurinoL  300 mg Oral Daily    amoxicillin-clavulanate 875-125mg  1 tablet Oral Q12H    ascorbic acid (vitamin C)  500 mg Oral Daily    aspirin  81 mg Oral Daily    atorvastatin  40 mg Oral QHS    budesonide  0.5 mg Nebulization Q12H    carvediloL  12.5 mg Oral BID WM    ferrous sulfate  1 tablet Oral Daily    furosemide  40 mg Oral Daily    gabapentin  300 mg Oral BID    hydrALAZINE  100 mg Oral Q12H    magnesium oxide  400 mg Oral TID    multivitamin  1 tablet Oral Daily    predniSONE  40 mg Oral Daily    spironolactone  25 mg Oral Daily    tamsulosin  0.4 mg Oral Daily    vitamin D  1,000 Units Oral Daily     Continuous Infusions:  PRN Meds:.  Current Facility-Administered Medications:     acetaminophen, 650 mg, Oral, Q6H PRN    albuterol sulfate, 2.5 mg, Nebulization, Q4H PRN    calcium carbonate, 500 mg, Oral, BID PRN    dextrose 50%, 12.5 g, Intravenous, PRN    dextrose 50%, 25 g, Intravenous, PRN    glucagon (human recombinant), 1 mg, Intramuscular, PRN    glucose, 16 g, Oral, PRN    glucose, 24 g, Oral, PRN    HYDROcodone-acetaminophen, 1 tablet, Oral, Q6H PRN    hydrOXYzine pamoate, 25 mg, Oral, Q8H PRN    insulin aspart U-100, 0-5 Units, Subcutaneous, QID (AC + HS) PRN    methocarbamoL, 500 mg, Oral, Q6H PRN    nitroGLYCERIN, 0.4 mg, Sublingual, Q5 Min PRN    senna-docusate 8.6-50 mg, 1 tablet, Oral, BID PRN    zolpidem, 10 mg, Oral, Nightly PRN    Anthropometrics  Temp: 97.4 °F (36.3 °C)  Height Method: Stated  Height: 5' 1" (154.9 cm)  Height (inches): 61 in  Weight Method: Bed Scale  Weight: 98.4 kg (217 lb)  Weight (lb): 217 lb  Ideal Body Weight (IBW), Male: 112 lb  % Ideal Body Weight, Male (lb): 193.75 %  BMI (Calculated): 41  BMI Grade: greater than 40 - morbid obesity       Estimated/Assessed Needs      Temp: 97.4 °F (36.3 °C)Oral  Weight " Used For Calorie Calculations: 98.4 kg (216 lb 14.9 oz)   Energy Need Method: Kcal/kg Energy Calorie Requirements (kcal): 1968-2460  Weight Used For Protein Calculations: 98.4 kg (216 lb 14.9 oz)  Protein Requirements: 79-99  Estimated Fluid Requirement Method: RDA Method    RDA Method (mL): 1968       Nutrition by Nursing  Diet/Nutrition Received: consistent carb/diabetic diet  Intake (%): 50%  Diet/Feeding Assistance: none  Diet/Feeding Tolerance: fair  Last Bowel Movement: 06/04/24                Nutrition Follow-Up  RD Follow-up?: Yes      Nutrition Discharge Planning: continue carb controlled diet as tolerated        Available via Secure Chat

## 2024-06-05 NOTE — PT/OT/SLP PROGRESS
Physical Therapy Treatment    Patient Name:  Mo Madera   MRN:  [id number]    Recommendations:     Discharge Recommendations: Moderate Intensity Therapy  Discharge Equipment Recommendations: grab bar  Barriers to discharge: Inaccessible home, Decreased caregiver support, and high falls risk    Assessment:     Mo Madera is a 74 y.o. male admitted with a medical diagnosis of Generalized weakness.  He presents with the following impairments/functional limitations: weakness, impaired endurance, impaired functional mobility, gait instability, impaired balance, decreased lower extremity function, pain, orthopedic precautions .    Pt did not have any left thigh pain or left knee buckling this visit.    SUPERVISORY VISIT performed with ARELI Stovall.    Rehab Prognosis: Good; patient would benefit from acute skilled PT services to address these deficits and reach maximum level of function.    Recent Surgery: * No surgery found *      Plan:     During this hospitalization, patient to be seen 5 x/week to address the identified rehab impairments via gait training, therapeutic activities, therapeutic exercises, neuromuscular re-education and progress toward the following goals:    Plan of Care Expires:  06/28/24    Subjective     Chief Complaint: Pt states back feeling better today.  Patient/Family Comments/goals: Pt to dc home alone.  Pain/Comfort:  Pain Rating 1: 5/10  Location - Side 1: Bilateral  Location - Orientation 1: midline  Location 1: back  Pain Addressed 1: Other (see comments), Cessation of Activity (COLD PACK)  Pain Rating Post-Intervention 1: 5/10      Objective:     Communicated with pt/PTA prior to session.  Patient found up in chair with chair check upon PT entry to room.     General Precautions: Standard, fall, diabetic  Orthopedic Precautions: spinal precautions  Braces:    Respiratory Status: Room air     Functional Mobility:  Transfers:     Sit to Stand:  minimum assistance with 4 wheeled walker  Gait: Pt  "amb'd x 65 ft with CGA using bariatric rollator/wc trail. Pt required min VC to hold head and back erect No knee buckling this visit.      AM-PAC 6 CLICK MOBILITY          Treatment & Education:  Seated back strengthening ex's: APT/PPT x 5 reps, reciprocal shldr flex w/back ext at end range x 10 reps, resisted trunk ext using yellow ball 3x5" hold at 3 levels on spine working distally. Passive stretching 1x30" each ulices HS and calf. Nu-step at Level 3.0 using BLE only x 2 minutes x 2 with rest break in between. Cold pack to low back during N-S.    Patient left up in chair with all lines intact, call button in reach, and chair alarm on..    GOALS:   Multidisciplinary Problems       Physical Therapy Goals          Problem: Physical Therapy    Goal Priority Disciplines Outcome Goal Variances Interventions   Physical Therapy Goal     PT, PT/OT Progressing     Description: STG - 1 week  1. Pt will transfer sit to stand with SBA.  2. Pt will amb 50 ft with CGA with appropriate AD.  3. Pt will transfer supine to/from sit with min Ax1.    LTG - 3 weeks  1. Pt will transfer sit to stand with SVN.  2. Pt will amb 250 ft with SBA with appropriate AD.  3. Pt will negotiate steps with rail with CGA.  4. Pt will negotiate ramp using AD with SBA.  5. Pt will transfer supine to/from sit with SBA.                       Time Tracking:     PT Received On: 06/05/24  PT Start Time: 1015     PT Stop Time: 1055  PT Total Time (min): 40 min     Billable Minutes: Gait Training 10, Therapeutic Exercise 30, and Total Time 40 minutes    Treatment Type: Treatment  PT/PTA: PT     Number of PTA visits since last PT visit: 0     06/05/2024  "

## 2024-06-05 NOTE — PLAN OF CARE
Problem: Adult Inpatient Plan of Care  Goal: Plan of Care Review  Outcome: Progressing     Problem: Fall Injury Risk  Goal: Absence of Fall and Fall-Related Injury  Outcome: Progressing

## 2024-06-06 LAB
ANION GAP SERPL CALCULATED.3IONS-SCNC: 10 MMOL/L (ref 7–16)
BUN SERPL-MCNC: 26 MG/DL (ref 7–18)
BUN/CREAT SERPL: 21 (ref 6–20)
CALCIUM SERPL-MCNC: 8.2 MG/DL (ref 8.5–10.1)
CHLORIDE SERPL-SCNC: 97 MMOL/L (ref 98–107)
CO2 SERPL-SCNC: 30 MMOL/L (ref 21–32)
CREAT SERPL-MCNC: 1.24 MG/DL (ref 0.7–1.3)
EGFR (NO RACE VARIABLE) (RUSH/TITUS): 61 ML/MIN/1.73M2
GLUCOSE SERPL-MCNC: 316 MG/DL (ref 70–105)
GLUCOSE SERPL-MCNC: 319 MG/DL (ref 74–106)
GLUCOSE SERPL-MCNC: 335 MG/DL (ref 70–105)
GLUCOSE SERPL-MCNC: 399 MG/DL (ref 70–105)
GLUCOSE SERPL-MCNC: 436 MG/DL (ref 70–105)
GLUCOSE SERPL-MCNC: 440 MG/DL (ref 70–105)
GLUCOSE SERPL-MCNC: 526 MG/DL (ref 70–105)
GLUCOSE SERPL-MCNC: 527 MG/DL (ref 70–105)
POTASSIUM SERPL-SCNC: 3.9 MMOL/L (ref 3.5–5.1)
SODIUM SERPL-SCNC: 133 MMOL/L (ref 136–145)

## 2024-06-06 PROCEDURE — 25000003 PHARM REV CODE 250: Performed by: NURSE PRACTITIONER

## 2024-06-06 PROCEDURE — 97110 THERAPEUTIC EXERCISES: CPT

## 2024-06-06 PROCEDURE — 25000242 PHARM REV CODE 250 ALT 637 W/ HCPCS: Performed by: NURSE PRACTITIONER

## 2024-06-06 PROCEDURE — 82962 GLUCOSE BLOOD TEST: CPT

## 2024-06-06 PROCEDURE — 27000958

## 2024-06-06 PROCEDURE — 97112 NEUROMUSCULAR REEDUCATION: CPT

## 2024-06-06 PROCEDURE — 97116 GAIT TRAINING THERAPY: CPT

## 2024-06-06 PROCEDURE — 97530 THERAPEUTIC ACTIVITIES: CPT

## 2024-06-06 PROCEDURE — 80048 BASIC METABOLIC PNL TOTAL CA: CPT | Performed by: NURSE PRACTITIONER

## 2024-06-06 PROCEDURE — 94640 AIRWAY INHALATION TREATMENT: CPT

## 2024-06-06 PROCEDURE — 63600175 PHARM REV CODE 636 W HCPCS: Performed by: NURSE PRACTITIONER

## 2024-06-06 PROCEDURE — 11000004 HC SNF PRIVATE

## 2024-06-06 PROCEDURE — 27000987 HC MATTRESS, MATRIX LOW PROFILE

## 2024-06-06 PROCEDURE — 36415 COLL VENOUS BLD VENIPUNCTURE: CPT | Performed by: NURSE PRACTITIONER

## 2024-06-06 PROCEDURE — 63600175 PHARM REV CODE 636 W HCPCS: Performed by: HOSPITALIST

## 2024-06-06 RX ORDER — IBUPROFEN 200 MG
24 TABLET ORAL
Status: DISCONTINUED | OUTPATIENT
Start: 2024-06-06 | End: 2024-06-10 | Stop reason: HOSPADM

## 2024-06-06 RX ORDER — INSULIN ASPART 100 [IU]/ML
0-10 INJECTION, SOLUTION INTRAVENOUS; SUBCUTANEOUS
Status: DISCONTINUED | OUTPATIENT
Start: 2024-06-06 | End: 2024-06-10 | Stop reason: HOSPADM

## 2024-06-06 RX ORDER — GLUCAGON 1 MG
1 KIT INJECTION
Status: DISCONTINUED | OUTPATIENT
Start: 2024-06-06 | End: 2024-06-10 | Stop reason: HOSPADM

## 2024-06-06 RX ORDER — IBUPROFEN 200 MG
16 TABLET ORAL
Status: DISCONTINUED | OUTPATIENT
Start: 2024-06-06 | End: 2024-06-10 | Stop reason: HOSPADM

## 2024-06-06 RX ADMIN — PREDNISONE 40 MG: 20 TABLET ORAL at 08:06

## 2024-06-06 RX ADMIN — OXYCODONE HYDROCHLORIDE AND ACETAMINOPHEN 500 MG: 500 TABLET ORAL at 08:06

## 2024-06-06 RX ADMIN — CARVEDILOL 12.5 MG: 6.25 TABLET, FILM COATED ORAL at 05:06

## 2024-06-06 RX ADMIN — Medication 400 MG: at 08:06

## 2024-06-06 RX ADMIN — BUDESONIDE INHALATION 0.5 MG: 0.5 SUSPENSION RESPIRATORY (INHALATION) at 08:06

## 2024-06-06 RX ADMIN — INSULIN ASPART 4 UNITS: 100 INJECTION, SOLUTION INTRAVENOUS; SUBCUTANEOUS at 11:06

## 2024-06-06 RX ADMIN — FUROSEMIDE 40 MG: 40 TABLET ORAL at 08:06

## 2024-06-06 RX ADMIN — INSULIN ASPART 5 UNITS: 100 INJECTION, SOLUTION INTRAVENOUS; SUBCUTANEOUS at 05:06

## 2024-06-06 RX ADMIN — CARVEDILOL 12.5 MG: 6.25 TABLET, FILM COATED ORAL at 07:06

## 2024-06-06 RX ADMIN — ALLOPURINOL 300 MG: 300 TABLET ORAL at 08:06

## 2024-06-06 RX ADMIN — IPRATROPIUM BROMIDE AND ALBUTEROL SULFATE 3 ML: 2.5; .5 SOLUTION RESPIRATORY (INHALATION) at 01:06

## 2024-06-06 RX ADMIN — INSULIN DETEMIR 15 UNITS: 100 INJECTION, SOLUTION SUBCUTANEOUS at 09:06

## 2024-06-06 RX ADMIN — IPRATROPIUM BROMIDE AND ALBUTEROL SULFATE 3 ML: 2.5; .5 SOLUTION RESPIRATORY (INHALATION) at 06:06

## 2024-06-06 RX ADMIN — ASPIRIN 81 MG CHEWABLE TABLET 81 MG: 81 TABLET CHEWABLE at 08:06

## 2024-06-06 RX ADMIN — FERROUS SULFATE TAB 325 MG (65 MG ELEMENTAL FE) 1 EACH: 325 (65 FE) TAB at 08:06

## 2024-06-06 RX ADMIN — AMOXICILLIN AND CLAVULANATE POTASSIUM 1 TABLET: 875; 125 TABLET, FILM COATED ORAL at 08:06

## 2024-06-06 RX ADMIN — BUDESONIDE INHALATION 0.5 MG: 0.5 SUSPENSION RESPIRATORY (INHALATION) at 07:06

## 2024-06-06 RX ADMIN — METHOCARBAMOL 500 MG: 500 TABLET ORAL at 02:06

## 2024-06-06 RX ADMIN — INSULIN ASPART 4 UNITS: 100 INJECTION, SOLUTION INTRAVENOUS; SUBCUTANEOUS at 06:06

## 2024-06-06 RX ADMIN — IPRATROPIUM BROMIDE AND ALBUTEROL SULFATE 3 ML: 2.5; .5 SOLUTION RESPIRATORY (INHALATION) at 07:06

## 2024-06-06 RX ADMIN — ATORVASTATIN CALCIUM 40 MG: 40 TABLET, FILM COATED ORAL at 08:06

## 2024-06-06 RX ADMIN — GABAPENTIN 300 MG: 300 CAPSULE ORAL at 08:06

## 2024-06-06 RX ADMIN — HYDRALAZINE HYDROCHLORIDE 100 MG: 25 TABLET ORAL at 08:06

## 2024-06-06 RX ADMIN — Medication 400 MG: at 02:06

## 2024-06-06 RX ADMIN — INSULIN ASPART 5 UNITS: 100 INJECTION, SOLUTION INTRAVENOUS; SUBCUTANEOUS at 09:06

## 2024-06-06 RX ADMIN — HYDROCODONE BITARTRATE AND ACETAMINOPHEN 1 TABLET: 10; 325 TABLET ORAL at 07:06

## 2024-06-06 RX ADMIN — Medication 1000 UNITS: at 08:06

## 2024-06-06 RX ADMIN — ZOLPIDEM TARTRATE 10 MG: 5 TABLET ORAL at 08:06

## 2024-06-06 RX ADMIN — SPIRONOLACTONE 25 MG: 25 TABLET ORAL at 08:06

## 2024-06-06 RX ADMIN — THERA TABS 1 TABLET: TAB at 08:06

## 2024-06-06 RX ADMIN — TAMSULOSIN HYDROCHLORIDE 0.4 MG: 0.4 CAPSULE ORAL at 08:06

## 2024-06-06 RX ADMIN — IPRATROPIUM BROMIDE AND ALBUTEROL SULFATE 3 ML: 2.5; .5 SOLUTION RESPIRATORY (INHALATION) at 12:06

## 2024-06-06 NOTE — PT/OT/SLP PROGRESS
Occupational Therapy   Treatment    Name: Mo Madera  MRN: [id number]  Admitting Diagnosis:  Generalized weakness       Recommendations:     Discharge Recommendations: Low Intensity Therapy  Discharge Equipment Recommendations:  none  Barriers to discharge:  Decreased caregiver support    Assessment:     Mo Madera is a 74 y.o. male with a medical diagnosis of Generalized weakness.  He presents with wanting to know who to talk to about his staples coming out; OT notified pt RN for today, Britton, through secure chat.  Performance deficits affecting function are weakness, impaired endurance, impaired self care skills, impaired functional mobility, gait instability, impaired balance, decreased safety awareness, pain, impaired skin.     Rehab Prognosis:  Good; patient would benefit from acute skilled OT services to address these deficits and reach maximum level of function.       Plan:     Patient to be seen 5 x/week to address the above listed problems via self-care/home management, community/work re-entry, therapeutic activities, therapeutic exercises, neuromuscular re-education, wheelchair management/training  Plan of Care Expires: 06/28/24  Plan of Care Reviewed with: patient    Subjective     Chief Complaint: getting ready to go home as soon as I can  Patient/Family Comments/goals: get stronger to go home  Pain/Comfort:       Objective:     Communicated with: pt prior to session.  Patient found up in chair with  (no lines or drains) upon OT entry to room.    General Precautions: Standard, fall, hearing impaired    Orthopedic Precautions:Full weight bearing  Braces: N/A  Respiratory Status: Room air     Occupational Performance:     Bed Mobility:    Na with OT today    Functional Mobility/Transfers:  Na with OT today    Activities of Daily Living:  Na with OT today      [facility] 6 Click ADL:      Treatment & Education:  To improve UB endurance, strength, OT facilitated pt with:  UBE x 12 min with no RB's throughout, low  level resist, F/R motions with cueing to do so  GREEN PUTTY to simulate kitchen prep task and improve FM and , small object search throughout to improve Self care skills and handling of AE    To improve reach, AROM, FM/ and trunk rotation with core forward leaning engagement:   OT initiated pt with vertical white board association activity, pt willing to perform and participate in this activity today for promoting core engagement as follows:    To work on neuromuscular re-education from seated posture:  The above vertical board activity initiated with pt along with verbal cueing to improve balance, coordination, kinesthetic sense, posture, and proprioception in upright seated position unsupported while increasing endurance as well for all functional mobility and self care skills in relation to pt recent spinal surgery post op rehab.  Energy conservation techniques and education related to pt throughout to reduce pain as he is improving in this area.  He relates good understanding but will need reinforcement.    This activity provided pt with a cognitive challenge for memory and recall as he worked on physical conditioning throughout.  Pt reported enjoying this task.  Good posture and improving endurance noted throughout.    Patient left up in chair with call button in reach and chair alarm on    GOALS:   Multidisciplinary Problems       Occupational Therapy Goals          Problem: Occupational Therapy    Goal Priority Disciplines Outcome Interventions   Occupational Therapy Goal     OT, PT/OT Progressing    Description: STG: within 2 weeks  Pt will perform grooming with setup at sinkside  Pt will bathe with mod I with AE as needed from sinkside  Pt will perform UE dressing with SVN after setup  Pt will perform LE dressing with SBA with AE as needed at bedside  Pt will sit EOB x 15 min with SVN assistance  Pt will transfer bed/chair/bsc with SBA  Pt will perform standing task x 5 min with SVN assistance  Pt  will tolerate 45 minutes of tx without fatigue      LT.Restore to max I with self care and mobility.                         Time Tracking:     OT Date of Treatment: 24  OT Start Time: 1050  OT Stop Time: 1130  OT Total Time (min): 40 min    Billable Minutes:Therapeutic Activity 18  Therapeutic Exercise 13  Neuromuscular Re-education 9    OT/ALYSSA: OT          2024

## 2024-06-06 NOTE — PLAN OF CARE
Problem: Adult Inpatient Plan of Care  Goal: Optimal Comfort and Wellbeing  Outcome: Progressing     Problem: Fall Injury Risk  Goal: Absence of Fall and Fall-Related Injury  Outcome: Progressing     Problem: Skin Injury Risk Increased  Goal: Skin Health and Integrity  Outcome: Progressing     Problem: Bariatric Environmental Safety  Goal: Safety Maintained with Care  Outcome: Progressing     Problem: Wound  Goal: Skin Health and Integrity  Outcome: Progressing     Problem: Diabetes Comorbidity  Goal: Blood Glucose Level Within Targeted Range  Outcome: Progressing

## 2024-06-06 NOTE — PT/OT/SLP PROGRESS
Physical Therapy Treatment    Patient Name:  Mo Madera   MRN:  [id number]    Recommendations:     Discharge Recommendations: Moderate Intensity Therapy  Discharge Equipment Recommendations: grab bar  Barriers to discharge: Inaccessible home, Decreased caregiver support, and falls risk    Assessment:     Mo Madera is a 74 y.o. male admitted with a medical diagnosis of Generalized weakness.  He presents with the following impairments/functional limitations: weakness, impaired endurance, impaired functional mobility, gait instability, impaired balance, decreased lower extremity function, pain, decreased ROM, impaired skin, orthopedic precautions .    PT initiated Log roll technique for supine to/from sit. Pt requires assist with mod A for sit to supine for lifting legs onto bed surface.    Rehab Prognosis: Good; patient would benefit from acute skilled PT services to address these deficits and reach maximum level of function.    Recent Surgery: * No surgery found *      Plan:     During this hospitalization, patient to be seen 5 x/week to address the identified rehab impairments via gait training, therapeutic activities, therapeutic exercises, neuromuscular re-education and progress toward the following goals:    Plan of Care Expires:  06/28/24    Subjective     Chief Complaint: Pt states he doesn't have any of his normal back pain, but the pain he is experiencing this afternoon is from his staples.  Patient/Family Comments/goals: Pt informed PT that he has F/U with his back surgeon on Monday at 9:30am (confirmed by his nurse, Dimas)  Pain/Comfort:  Pain Rating 1: 5/10  Location - Side 1: Bilateral  Location - Orientation 1: midline  Location 1: back (staples)  Pain Addressed 1: Other (see comments), Pre-medicate for activity (cold pack)  Pain Rating Post-Intervention 1: 4/10      Objective:     Communicated with Dimas RN  after PT session to confirm pt's appt Monday.  Patient found HOB elevated with peripheral IV upon  PT entry to room.     General Precautions: Standard, fall  Orthopedic Precautions: spinal precautions  Braces:    Respiratory Status: Room air     Functional Mobility:  Bed Mobility:     Rolling Left:  min A progressing to SBA using Log Roll technique  Supine to Sit: min A progressing to SBA using Log Roll technique  Sit to Supine: moderate assistance and using Log Roll technique  Transfers:     Sit to Stand:  stand by assistance and from bed and rollator seat with 4 wheeled walker  Gait: 125 x 2 with CGA using bariatric rollator with seated rest break on rollator seat. Mod VC to perform Abdominal bracing, holding head erect and holding back erect.      AM-PAC 6 CLICK MOBILITY  Turning over in bed (including adjusting bedclothes, sheets and blankets)?: 3  Sitting down on and standing up from a chair with arms (e.g., wheelchair, bedside commode, etc.): 3  Moving from lying on back to sitting on the side of the bed?: 3  Moving to and from a bed to a chair (including a wheelchair)?: 3  Need to walk in hospital room?: 3  Climbing 3-5 steps with a railing?: 1  Basic Mobility Total Score: 16       Treatment & Education:  Pt instructed in Log Roll technique for rolling supine to/from side lying x 4 reps and supine to/from sit x 2 reps. Bed ex's BLE x 10 reps each: APT/PPT, HS. Standing ex's BLE x 10 reps each: hip abd, hip ext, mini squats. Cold pack to low back x 20 min.    Patient left up in chair with call button in reach, chair alarm on, and cold pack ..    GOALS:   Multidisciplinary Problems       Physical Therapy Goals          Problem: Physical Therapy    Goal Priority Disciplines Outcome Goal Variances Interventions   Physical Therapy Goal     PT, PT/OT Progressing     Description: STG - 1 week  1. Pt will transfer sit to stand with SBA.  2. Pt will amb 50 ft with CGA with appropriate AD.  3. Pt will transfer supine to/from sit with min Ax1.    LTG - 3 weeks  1. Pt will transfer sit to stand with SVN.  2. Pt will  amb 250 ft with SBA with appropriate AD.  3. Pt will negotiate steps with rail with CGA.  4. Pt will negotiate ramp using AD with SBA.  5. Pt will transfer supine to/from sit with SBA.                       Time Tracking:     PT Received On: 06/06/24  PT Start Time: 1527     PT Stop Time: 1602  PT Total Time (min): 35 min     Billable Minutes: Gait Training 13, Therapeutic Activity 15, Therapeutic Exercise 7, and Total Time 35 min    Treatment Type: Treatment  PT/PTA: PT     Number of PTA visits since last PT visit: 0     06/06/2024

## 2024-06-06 NOTE — PLAN OF CARE
[facility]   Interdisciplinary Team Meeting    Patient: Mo Madera   Today's Date: 6/6/2024   Estimated D/C Date: 6/14/2024        Physician: Carlos Perez DO Nurse Practitioner: Aakash Velasquez NP   Pharmacy: Ken Paulino, PharmD Unit Director: Litzy Melendez RN   : Anastacio Reyes RN Physical/Occupational Therapy: Mindy Romero OT   Speech Therapy: [facility] Activity Therapy: Nani Antonio   Nursing: Litzy Melendez RN  Respiratory: Cyndee Mcdonald,  Dietary: Tory Das RD  Other: n/a     Nursing  New Symptoms/Problems: n/a  Last Bowel Movement: 06/04/24  Urine: continent  Lauren: No  Bowel: continent   Constipated: No  Diarrhea: No   Isolation: No  Wound Care: Yes  Wound Location/Tx: b ack  Cognition: WNL  Aspiration Precautions: No  Comment(s): n/a    Respiratory:   O2 Device: Room Air  O2 Flow: n/a  SpO2: 94%  Neb Tx: Yes  Comment(s): n/a    Dietary    Nutrition: Diabetic  Comment(s): n/a    Speech Therapy  Speech/Swallowing: No current speech or swallowing issues  Comment(s): No    Physical Therapy  Gait/Assistive Device: ambulatory with bariatric rollator ELOS: Plan to DC 6/20/2024    Transfers: Minimal Assistance  Bed Mobility: Contact Guard Assistance Range of Motion/Restrictions: spinal precautions  Comment(s): n/a     Occupational Therapy  Eating/Grooming: Independent Toileting: Minimal Assistance   Bathing: Minimal Assistance Dressing (Upper Body): Supervision or Set-up Assistance   Dressing (Lower Body): Minimal Assistance Comment(s): n/a     Activity Therapy  Level of participation: Active participation  Comment(s): n/a    Pharmacy   Medication Changes (see MD orders in chart): No  Labs Reviewed: Yes  New Lab Orders: No  Comment(s): n/a      Tx Plan/Recommendations reviewed with family and/or patient on (date) 6/6.  Additional family Conference/Training: n/a  D/C Plan/Recommendations: Home with HH  JONNIE:  6/14/24 Comment(s): n/a

## 2024-06-07 LAB
GLUCOSE SERPL-MCNC: 228 MG/DL (ref 70–105)
GLUCOSE SERPL-MCNC: 246 MG/DL (ref 70–105)
GLUCOSE SERPL-MCNC: 393 MG/DL (ref 70–105)
GLUCOSE SERPL-MCNC: 410 MG/DL (ref 70–105)

## 2024-06-07 PROCEDURE — 97116 GAIT TRAINING THERAPY: CPT

## 2024-06-07 PROCEDURE — 97110 THERAPEUTIC EXERCISES: CPT

## 2024-06-07 PROCEDURE — 63600175 PHARM REV CODE 636 W HCPCS: Performed by: NURSE PRACTITIONER

## 2024-06-07 PROCEDURE — 25000003 PHARM REV CODE 250: Performed by: NURSE PRACTITIONER

## 2024-06-07 PROCEDURE — 94640 AIRWAY INHALATION TREATMENT: CPT

## 2024-06-07 PROCEDURE — 25000242 PHARM REV CODE 250 ALT 637 W/ HCPCS: Performed by: NURSE PRACTITIONER

## 2024-06-07 PROCEDURE — 27000987 HC MATTRESS, MATRIX LOW PROFILE

## 2024-06-07 PROCEDURE — 97530 THERAPEUTIC ACTIVITIES: CPT

## 2024-06-07 PROCEDURE — 82962 GLUCOSE BLOOD TEST: CPT

## 2024-06-07 PROCEDURE — 27000958

## 2024-06-07 PROCEDURE — 63600175 PHARM REV CODE 636 W HCPCS: Performed by: HOSPITALIST

## 2024-06-07 PROCEDURE — 99900035 HC TECH TIME PER 15 MIN (STAT)

## 2024-06-07 PROCEDURE — 11000004 HC SNF PRIVATE

## 2024-06-07 PROCEDURE — 94761 N-INVAS EAR/PLS OXIMETRY MLT: CPT

## 2024-06-07 RX ORDER — PREDNISONE 20 MG/1
20 TABLET ORAL DAILY
Status: DISCONTINUED | OUTPATIENT
Start: 2024-06-08 | End: 2024-06-10 | Stop reason: HOSPADM

## 2024-06-07 RX ADMIN — IPRATROPIUM BROMIDE AND ALBUTEROL SULFATE 3 ML: 2.5; .5 SOLUTION RESPIRATORY (INHALATION) at 06:06

## 2024-06-07 RX ADMIN — Medication 400 MG: at 08:06

## 2024-06-07 RX ADMIN — INSULIN ASPART 5 UNITS: 100 INJECTION, SOLUTION INTRAVENOUS; SUBCUTANEOUS at 08:06

## 2024-06-07 RX ADMIN — ATORVASTATIN CALCIUM 40 MG: 40 TABLET, FILM COATED ORAL at 08:06

## 2024-06-07 RX ADMIN — INSULIN ASPART 10 UNITS: 100 INJECTION, SOLUTION INTRAVENOUS; SUBCUTANEOUS at 05:06

## 2024-06-07 RX ADMIN — GABAPENTIN 300 MG: 300 CAPSULE ORAL at 08:06

## 2024-06-07 RX ADMIN — INSULIN ASPART 4 UNITS: 100 INJECTION, SOLUTION INTRAVENOUS; SUBCUTANEOUS at 06:06

## 2024-06-07 RX ADMIN — ASPIRIN 81 MG CHEWABLE TABLET 81 MG: 81 TABLET CHEWABLE at 08:06

## 2024-06-07 RX ADMIN — HYDROCODONE BITARTRATE AND ACETAMINOPHEN 1 TABLET: 10; 325 TABLET ORAL at 09:06

## 2024-06-07 RX ADMIN — THERA TABS 1 TABLET: TAB at 08:06

## 2024-06-07 RX ADMIN — ALLOPURINOL 300 MG: 300 TABLET ORAL at 08:06

## 2024-06-07 RX ADMIN — IPRATROPIUM BROMIDE AND ALBUTEROL SULFATE 3 ML: 2.5; .5 SOLUTION RESPIRATORY (INHALATION) at 07:06

## 2024-06-07 RX ADMIN — OXYCODONE HYDROCHLORIDE AND ACETAMINOPHEN 500 MG: 500 TABLET ORAL at 08:06

## 2024-06-07 RX ADMIN — BUDESONIDE INHALATION 0.5 MG: 0.5 SUSPENSION RESPIRATORY (INHALATION) at 07:06

## 2024-06-07 RX ADMIN — INSULIN DETEMIR 15 UNITS: 100 INJECTION, SOLUTION SUBCUTANEOUS at 08:06

## 2024-06-07 RX ADMIN — FUROSEMIDE 40 MG: 40 TABLET ORAL at 08:06

## 2024-06-07 RX ADMIN — HYDRALAZINE HYDROCHLORIDE 100 MG: 25 TABLET ORAL at 08:06

## 2024-06-07 RX ADMIN — AMOXICILLIN AND CLAVULANATE POTASSIUM 1 TABLET: 875; 125 TABLET, FILM COATED ORAL at 08:06

## 2024-06-07 RX ADMIN — ZOLPIDEM TARTRATE 10 MG: 5 TABLET ORAL at 08:06

## 2024-06-07 RX ADMIN — TAMSULOSIN HYDROCHLORIDE 0.4 MG: 0.4 CAPSULE ORAL at 08:06

## 2024-06-07 RX ADMIN — IPRATROPIUM BROMIDE AND ALBUTEROL SULFATE 3 ML: 2.5; .5 SOLUTION RESPIRATORY (INHALATION) at 12:06

## 2024-06-07 RX ADMIN — INSULIN ASPART 4 UNITS: 100 INJECTION, SOLUTION INTRAVENOUS; SUBCUTANEOUS at 11:06

## 2024-06-07 RX ADMIN — CARVEDILOL 12.5 MG: 6.25 TABLET, FILM COATED ORAL at 08:06

## 2024-06-07 RX ADMIN — Medication 1000 UNITS: at 08:06

## 2024-06-07 RX ADMIN — SPIRONOLACTONE 25 MG: 25 TABLET ORAL at 08:06

## 2024-06-07 RX ADMIN — CARVEDILOL 12.5 MG: 6.25 TABLET, FILM COATED ORAL at 05:06

## 2024-06-07 RX ADMIN — Medication 400 MG: at 03:06

## 2024-06-07 RX ADMIN — FERROUS SULFATE TAB 325 MG (65 MG ELEMENTAL FE) 1 EACH: 325 (65 FE) TAB at 08:06

## 2024-06-07 RX ADMIN — PREDNISONE 40 MG: 20 TABLET ORAL at 08:06

## 2024-06-07 RX ADMIN — BUDESONIDE INHALATION 0.5 MG: 0.5 SUSPENSION RESPIRATORY (INHALATION) at 08:06

## 2024-06-07 NOTE — PLAN OF CARE
Problem: Adult Inpatient Plan of Care  Goal: Optimal Comfort and Wellbeing  Outcome: Progressing     Problem: Fall Injury Risk  Goal: Absence of Fall and Fall-Related Injury  Outcome: Progressing     Problem: Skin Injury Risk Increased  Goal: Skin Health and Integrity  Outcome: Progressing     Problem: Bariatric Environmental Safety  Goal: Safety Maintained with Care  Outcome: Progressing     Problem: Wound  Goal: Absence of Infection Signs and Symptoms  Outcome: Progressing     Problem: Diabetes Comorbidity  Goal: Blood Glucose Level Within Targeted Range  Outcome: Progressing

## 2024-06-07 NOTE — PLAN OF CARE
Problem: Adult Inpatient Plan of Care  Goal: Plan of Care Review  Outcome: Progressing  Goal: Patient-Specific Goal (Individualized)  Outcome: Progressing  Goal: Absence of Hospital-Acquired Illness or Injury  Outcome: Progressing  Goal: Optimal Comfort and Wellbeing  Outcome: Progressing  Goal: Readiness for Transition of Care  Outcome: Progressing     Problem: Fall Injury Risk  Goal: Absence of Fall and Fall-Related Injury  Outcome: Progressing     Problem: Skin Injury Risk Increased  Goal: Skin Health and Integrity  Outcome: Progressing     Problem: Bariatric Environmental Safety  Goal: Safety Maintained with Care  Outcome: Progressing     Problem: Wound  Goal: Optimal Coping  Outcome: Progressing  Goal: Optimal Functional Ability  Outcome: Progressing  Goal: Absence of Infection Signs and Symptoms  Outcome: Progressing  Goal: Improved Oral Intake  Outcome: Progressing  Goal: Optimal Pain Control and Function  Outcome: Progressing  Goal: Skin Health and Integrity  Outcome: Progressing  Goal: Optimal Wound Healing  Outcome: Progressing     Problem: Breathing Pattern Ineffective  Goal: Effective Breathing Pattern  Outcome: Progressing     Problem: Diabetes Comorbidity  Goal: Blood Glucose Level Within Targeted Range  Outcome: Progressing

## 2024-06-07 NOTE — PT/OT/SLP PROGRESS
Physical Therapy Treatment    Patient Name:  Mo Madera   MRN:  [id number]    Recommendations:     Discharge Recommendations: Moderate Intensity Therapy  Discharge Equipment Recommendations: grab bar  Barriers to discharge: Inaccessible home and Decreased caregiver support    Assessment:     Mo Madera is a 74 y.o. male admitted with a medical diagnosis of Generalized weakness.  He presents with the following impairments/functional limitations: weakness, impaired endurance, gait instability, impaired balance, impaired functional mobility, orthopedic precautions .    Pt seen for split treatment due to scheduling conflict. In the morning, pt seen for gait and ther ex. In the afternoon, pt seen to issue HEP and review Back Protection handout.    Rehab Prognosis: Good; patient would benefit from acute skilled PT services to address these deficits and reach maximum level of function.    Recent Surgery: * No surgery found *      Plan:     During this hospitalization, patient to be seen 5 x/week to address the identified rehab impairments via gait training, therapeutic activities, therapeutic exercises, neuromuscular re-education and progress toward the following goals:    Plan of Care Expires:  06/28/24    Subjective     Chief Complaint: Pt states his back doesn't hurt.   Patient/Family Comments/goals: Pt has MDV 6/10/24 at 0930. States he wants to dc home end of next week.  Pain/Comfort:  Pain Rating 1: 0/10  Pain Addressed 1: Pre-medicate for activity, Reposition      Objective:     Communicated with pt prior to session.  Patient found up in chair with peripheral IV, chair check upon PT entry to room.     General Precautions: Standard, fall  Orthopedic Precautions: spinal precautions  Braces:    Respiratory Status: Room air     Functional Mobility:  Transfers:     Sit to Stand:  stand by assistance with 4 wheeled walker  Gait: pt amb'd x 250 ft using bar rollator with CGA with mod Vcs to stand erect with head  up.      AM-PAC 6 CLICK MOBILITY          Treatment & Education:  Nu-step at Level 3.0 using BLE only x 8 minutes. Pt seen in the afternoon to issue HEP (APT/PPT, seated resisted trunk ext, LTR in supine) and review Back Protection handout including using Log Roll technique for supine to/from sitting. Pt verbalized understanding.     Patient left up in chair with call button in reach and chair alarm on..    GOALS:   Multidisciplinary Problems       Physical Therapy Goals          Problem: Physical Therapy    Goal Priority Disciplines Outcome Goal Variances Interventions   Physical Therapy Goal     PT, PT/OT Progressing     Description: STG - 1 week  1. Pt will transfer sit to stand with SBA.  2. Pt will amb 50 ft with CGA with appropriate AD.  3. Pt will transfer supine to/from sit with min Ax1.    LTG - 3 weeks  1. Pt will transfer sit to stand with SVN.  2. Pt will amb 250 ft with SBA with appropriate AD.  3. Pt will negotiate steps with rail with CGA.  4. Pt will negotiate ramp using AD with SBA.  5. Pt will transfer supine to/from sit with SBA.                       Time Tracking:     PT Received On: 06/07/24  PT Start Time: 0948 (second start time: 1540)     PT Stop Time: 1010 (second end time: 1600)  PT Total Time (min): 22 min + 20 minutes= 42 total minutes    Billable Minutes: Gait Training 10, Therapeutic Activity 15, Therapeutic Exercise 17, and Total Time 42    Treatment Type: Treatment  PT/PTA: PT     Number of PTA visits since last PT visit: 0     06/07/2024

## 2024-06-07 NOTE — PT/OT/SLP PROGRESS
Occupational Therapy   Treatment    Name: Mo Madera  MRN: [id number]  Admitting Diagnosis:  Generalized weakness       Recommendations:     Discharge Recommendations: Low Intensity Therapy  Discharge Equipment Recommendations:  none  Barriers to discharge:  Decreased caregiver support    Assessment:     Mo Madera is a 74 y.o. male with a medical diagnosis of Generalized weakness.  He presents with no new complaints, wanting to go home. Performance deficits affecting function are weakness, impaired endurance, impaired self care skills, impaired functional mobility, gait instability, impaired balance, decreased safety awareness, pain, impaired skin.     Rehab Prognosis:  Good; patient would benefit from acute skilled OT services to address these deficits and reach maximum level of function.       Plan:     Patient to be seen 5 x/week to address the above listed problems via self-care/home management, community/work re-entry, therapeutic activities, therapeutic exercises, neuromuscular re-education, wheelchair management/training  Plan of Care Expires: 06/28/24  Plan of Care Reviewed with: patient    Subjective     Chief Complaint: ready to go home as soon as he can, feeling better he reports  Patient/Family Comments/goals: home alone with intermittent svn  Pain/Comfort:   Mild back soreness he reports    Objective:     Communicated with: pt prior to session.  Patient found up in chair with  (no lines or drains) upon OT entry to room.    General Precautions: Standard, fall, hearing impaired    Orthopedic Precautions:Full weight bearing  Braces: N/A  Respiratory Status: Room air     Occupational Performance:     Functional Mobility/Transfers:  Functional Mobility: pt propelling himself well in w/c with independence, slow     Activities of Daily Living:  Min a to sba overall, has AE for LB dressing      [facility] 6 Click ADL:      Treatment & Education:  To improve UB endurance, strength, OT facilitated pt with:  MADELEINE rogers  10min with no RB's throughout, low level resist  1# dowel x 15 reps x 2 sets in ch press, sh flexion, and horiz abd/add    To improve reach, AROM, FM/ and trunk rotation with core forward leaning engagement:   seated Reciprocal pulleys x 6 min in sh flexion and abduction    To increase functional mobility skills:  OT engaged pt in w/c prop in hallway to get back to  room >75 feet, pt requiring no assist and no further cueing to get back to his room; discussed with pt that THIS is a great exercise activity for him to complete this weekend for improving overall stamina, endurance, and UB strength in preparation for d/c murali      Patient left up in chair with call button in reach    GOALS:   Multidisciplinary Problems       Occupational Therapy Goals          Problem: Occupational Therapy    Goal Priority Disciplines Outcome Interventions   Occupational Therapy Goal     OT, PT/OT Progressing    Description: STG: within 2 weeks  Pt will perform grooming with setup at sinkside  Pt will bathe with mod I with AE as needed from sinkside  Pt will perform UE dressing with SVN after setup  Pt will perform LE dressing with SBA with AE as needed at bedside  Pt will sit EOB x 15 min with SVN assistance  Pt will transfer bed/chair/bsc with SBA  Pt will perform standing task x 5 min with SVN assistance  Pt will tolerate 45 minutes of tx without fatigue      LT.Restore to max I with self care and mobility.                         Time Tracking:     OT Date of Treatment: 24  OT Start Time: 910  OT Stop Time: 940  OT Total Time (min): 30 min    Billable Minutes:Therapeutic Exercise 25, THERACT 5 min    OT/ALYSSA: OT          2024

## 2024-06-08 LAB
GLUCOSE SERPL-MCNC: 230 MG/DL (ref 70–105)
GLUCOSE SERPL-MCNC: 265 MG/DL (ref 70–105)
GLUCOSE SERPL-MCNC: 352 MG/DL (ref 70–105)
GLUCOSE SERPL-MCNC: 391 MG/DL (ref 70–105)

## 2024-06-08 PROCEDURE — 63600175 PHARM REV CODE 636 W HCPCS: Performed by: HOSPITALIST

## 2024-06-08 PROCEDURE — 27000958

## 2024-06-08 PROCEDURE — 25000242 PHARM REV CODE 250 ALT 637 W/ HCPCS: Performed by: NURSE PRACTITIONER

## 2024-06-08 PROCEDURE — 63600175 PHARM REV CODE 636 W HCPCS: Performed by: NURSE PRACTITIONER

## 2024-06-08 PROCEDURE — 27000987 HC MATTRESS, MATRIX LOW PROFILE

## 2024-06-08 PROCEDURE — 11000004 HC SNF PRIVATE

## 2024-06-08 PROCEDURE — 25000003 PHARM REV CODE 250: Performed by: NURSE PRACTITIONER

## 2024-06-08 PROCEDURE — 82962 GLUCOSE BLOOD TEST: CPT

## 2024-06-08 PROCEDURE — 94640 AIRWAY INHALATION TREATMENT: CPT

## 2024-06-08 PROCEDURE — 94761 N-INVAS EAR/PLS OXIMETRY MLT: CPT

## 2024-06-08 RX ADMIN — INSULIN ASPART 4 UNITS: 100 INJECTION, SOLUTION INTRAVENOUS; SUBCUTANEOUS at 11:06

## 2024-06-08 RX ADMIN — CARVEDILOL 12.5 MG: 6.25 TABLET, FILM COATED ORAL at 05:06

## 2024-06-08 RX ADMIN — IPRATROPIUM BROMIDE AND ALBUTEROL SULFATE 3 ML: 2.5; .5 SOLUTION RESPIRATORY (INHALATION) at 07:06

## 2024-06-08 RX ADMIN — IPRATROPIUM BROMIDE AND ALBUTEROL SULFATE 3 ML: 2.5; .5 SOLUTION RESPIRATORY (INHALATION) at 12:06

## 2024-06-08 RX ADMIN — SPIRONOLACTONE 25 MG: 25 TABLET ORAL at 08:06

## 2024-06-08 RX ADMIN — HYDROCODONE BITARTRATE AND ACETAMINOPHEN 1 TABLET: 10; 325 TABLET ORAL at 07:06

## 2024-06-08 RX ADMIN — INSULIN ASPART 5 UNITS: 100 INJECTION, SOLUTION INTRAVENOUS; SUBCUTANEOUS at 08:06

## 2024-06-08 RX ADMIN — HYDRALAZINE HYDROCHLORIDE 100 MG: 25 TABLET ORAL at 08:06

## 2024-06-08 RX ADMIN — INSULIN DETEMIR 15 UNITS: 100 INJECTION, SOLUTION SUBCUTANEOUS at 08:06

## 2024-06-08 RX ADMIN — ZOLPIDEM TARTRATE 10 MG: 5 TABLET ORAL at 08:06

## 2024-06-08 RX ADMIN — TAMSULOSIN HYDROCHLORIDE 0.4 MG: 0.4 CAPSULE ORAL at 08:06

## 2024-06-08 RX ADMIN — INSULIN ASPART 6 UNITS: 100 INJECTION, SOLUTION INTRAVENOUS; SUBCUTANEOUS at 05:06

## 2024-06-08 RX ADMIN — HYDROCODONE BITARTRATE AND ACETAMINOPHEN 1 TABLET: 10; 325 TABLET ORAL at 09:06

## 2024-06-08 RX ADMIN — BUDESONIDE INHALATION 0.5 MG: 0.5 SUSPENSION RESPIRATORY (INHALATION) at 07:06

## 2024-06-08 RX ADMIN — OXYCODONE HYDROCHLORIDE AND ACETAMINOPHEN 500 MG: 500 TABLET ORAL at 08:06

## 2024-06-08 RX ADMIN — IPRATROPIUM BROMIDE AND ALBUTEROL SULFATE 3 ML: 2.5; .5 SOLUTION RESPIRATORY (INHALATION) at 06:06

## 2024-06-08 RX ADMIN — ATORVASTATIN CALCIUM 40 MG: 40 TABLET, FILM COATED ORAL at 08:06

## 2024-06-08 RX ADMIN — Medication 400 MG: at 08:06

## 2024-06-08 RX ADMIN — FERROUS SULFATE TAB 325 MG (65 MG ELEMENTAL FE) 1 EACH: 325 (65 FE) TAB at 08:06

## 2024-06-08 RX ADMIN — AMOXICILLIN AND CLAVULANATE POTASSIUM 1 TABLET: 875; 125 TABLET, FILM COATED ORAL at 08:06

## 2024-06-08 RX ADMIN — CARVEDILOL 12.5 MG: 6.25 TABLET, FILM COATED ORAL at 07:06

## 2024-06-08 RX ADMIN — Medication 1000 UNITS: at 08:06

## 2024-06-08 RX ADMIN — INSULIN ASPART 10 UNITS: 100 INJECTION, SOLUTION INTRAVENOUS; SUBCUTANEOUS at 05:06

## 2024-06-08 RX ADMIN — ALLOPURINOL 300 MG: 300 TABLET ORAL at 08:06

## 2024-06-08 RX ADMIN — PREDNISONE 20 MG: 20 TABLET ORAL at 08:06

## 2024-06-08 RX ADMIN — THERA TABS 1 TABLET: TAB at 08:06

## 2024-06-08 RX ADMIN — Medication 400 MG: at 02:06

## 2024-06-08 RX ADMIN — FUROSEMIDE 40 MG: 40 TABLET ORAL at 08:06

## 2024-06-08 RX ADMIN — GABAPENTIN 300 MG: 300 CAPSULE ORAL at 08:06

## 2024-06-08 RX ADMIN — ASPIRIN 81 MG CHEWABLE TABLET 81 MG: 81 TABLET CHEWABLE at 08:06

## 2024-06-08 NOTE — PLAN OF CARE
Problem: Adult Inpatient Plan of Care  Goal: Plan of Care Review  6/8/2024 1727 by Mary Kate Patrick LPN  Outcome: Progressing  6/8/2024 1508 by Mary Kate Patrick LPN  Outcome: Progressing  Goal: Patient-Specific Goal (Individualized)  6/8/2024 1727 by Mary Kate Patrick LPN  Outcome: Progressing  6/8/2024 1508 by Mary Kate Patrick LPN  Outcome: Progressing  Goal: Absence of Hospital-Acquired Illness or Injury  6/8/2024 1727 by Mary Kate Patrick LPN  Outcome: Progressing  6/8/2024 1508 by Mary Kate Patrick LPN  Outcome: Progressing  Goal: Optimal Comfort and Wellbeing  6/8/2024 1727 by Mary Kate Patrick LPN  Outcome: Progressing  6/8/2024 1508 by Mary Kate Patrick LPN  Outcome: Progressing  Goal: Readiness for Transition of Care  6/8/2024 1727 by Mary Kate Patrick LPN  Outcome: Progressing  6/8/2024 1508 by Mary Kate Patrick LPN  Outcome: Progressing     Problem: Fall Injury Risk  Goal: Absence of Fall and Fall-Related Injury  6/8/2024 1727 by Mary Kate Patrick LPN  Outcome: Progressing  6/8/2024 1508 by Mary Kate Patrick LPN  Outcome: Progressing     Problem: Skin Injury Risk Increased  Goal: Skin Health and Integrity  6/8/2024 1727 by Mary Kate Patrick LPN  Outcome: Progressing  6/8/2024 1508 by Mary Kate Patrick LPN  Outcome: Progressing     Problem: Bariatric Environmental Safety  Goal: Safety Maintained with Care  6/8/2024 1727 by Mary Kate Patrick LPN  Outcome: Progressing  6/8/2024 1508 by Mary Kate Patrick LPN  Outcome: Progressing     Problem: Wound  Goal: Optimal Coping  6/8/2024 1727 by Mary Kate Patrick LPN  Outcome: Progressing  6/8/2024 1508 by Mary Kate Patrick LPN  Outcome: Progressing  Goal: Optimal Functional Ability  6/8/2024 1727 by Mary Kate Patrick LPN  Outcome: Progressing  6/8/2024 1508 by Mary Kate Patrick LPN  Outcome: Progressing  Goal: Absence of Infection Signs and Symptoms  6/8/2024 1727 by Mary Kate Patrick LPN  Outcome: Progressing  6/8/2024 1508 by Darnell,  Mary Kate VALDOVINOS LPN  Outcome: Progressing  Goal: Improved Oral Intake  6/8/2024 1727 by Mary Kate Patrick LPN  Outcome: Progressing  6/8/2024 1508 by Mary Kate Patrick LPN  Outcome: Progressing  Goal: Optimal Pain Control and Function  6/8/2024 1727 by Mary Kate Patrick LPN  Outcome: Progressing  6/8/2024 1508 by Mary Kate Patrick LPN  Outcome: Progressing  Goal: Skin Health and Integrity  6/8/2024 1727 by Mary Kate Patrick LPN  Outcome: Progressing  6/8/2024 1508 by Mary Kate Patrick LPN  Outcome: Progressing  Goal: Optimal Wound Healing  6/8/2024 1727 by Mary Kate Patrick LPN  Outcome: Progressing  6/8/2024 1508 by Mary Kate Patrick LPN  Outcome: Progressing     Problem: Breathing Pattern Ineffective  Goal: Effective Breathing Pattern  6/8/2024 1727 by Mary Kate Patrick LPN  Outcome: Progressing  6/8/2024 1508 by Mary Kate Patrick LPN  Outcome: Progressing     Problem: Diabetes Comorbidity  Goal: Blood Glucose Level Within Targeted Range  6/8/2024 1727 by Mary Kate Patrick LPN  Outcome: Progressing  6/8/2024 1508 by Mary Kate Patrick LPN  Outcome: Progressing

## 2024-06-09 LAB
GLUCOSE SERPL-MCNC: 128 MG/DL (ref 70–105)
GLUCOSE SERPL-MCNC: 222 MG/DL (ref 70–105)
GLUCOSE SERPL-MCNC: 325 MG/DL (ref 70–105)
GLUCOSE SERPL-MCNC: 327 MG/DL (ref 70–105)

## 2024-06-09 PROCEDURE — 25000242 PHARM REV CODE 250 ALT 637 W/ HCPCS: Performed by: NURSE PRACTITIONER

## 2024-06-09 PROCEDURE — 94640 AIRWAY INHALATION TREATMENT: CPT

## 2024-06-09 PROCEDURE — 25000003 PHARM REV CODE 250: Performed by: NURSE PRACTITIONER

## 2024-06-09 PROCEDURE — 82962 GLUCOSE BLOOD TEST: CPT

## 2024-06-09 PROCEDURE — 63600175 PHARM REV CODE 636 W HCPCS: Performed by: HOSPITALIST

## 2024-06-09 PROCEDURE — 27000987 HC MATTRESS, MATRIX LOW PROFILE

## 2024-06-09 PROCEDURE — 27000958

## 2024-06-09 PROCEDURE — 63600175 PHARM REV CODE 636 W HCPCS: Performed by: NURSE PRACTITIONER

## 2024-06-09 PROCEDURE — 11000004 HC SNF PRIVATE

## 2024-06-09 RX ADMIN — TAMSULOSIN HYDROCHLORIDE 0.4 MG: 0.4 CAPSULE ORAL at 08:06

## 2024-06-09 RX ADMIN — METHOCARBAMOL 500 MG: 500 TABLET ORAL at 01:06

## 2024-06-09 RX ADMIN — ALLOPURINOL 300 MG: 300 TABLET ORAL at 08:06

## 2024-06-09 RX ADMIN — IPRATROPIUM BROMIDE AND ALBUTEROL SULFATE 3 ML: 2.5; .5 SOLUTION RESPIRATORY (INHALATION) at 07:06

## 2024-06-09 RX ADMIN — AMOXICILLIN AND CLAVULANATE POTASSIUM 1 TABLET: 875; 125 TABLET, FILM COATED ORAL at 08:06

## 2024-06-09 RX ADMIN — ATORVASTATIN CALCIUM 40 MG: 40 TABLET, FILM COATED ORAL at 08:06

## 2024-06-09 RX ADMIN — GABAPENTIN 300 MG: 300 CAPSULE ORAL at 08:06

## 2024-06-09 RX ADMIN — ZOLPIDEM TARTRATE 10 MG: 5 TABLET ORAL at 08:06

## 2024-06-09 RX ADMIN — OXYCODONE HYDROCHLORIDE AND ACETAMINOPHEN 500 MG: 500 TABLET ORAL at 08:06

## 2024-06-09 RX ADMIN — PREDNISONE 20 MG: 20 TABLET ORAL at 08:06

## 2024-06-09 RX ADMIN — HYDRALAZINE HYDROCHLORIDE 100 MG: 25 TABLET ORAL at 08:06

## 2024-06-09 RX ADMIN — INSULIN ASPART 4 UNITS: 100 INJECTION, SOLUTION INTRAVENOUS; SUBCUTANEOUS at 08:06

## 2024-06-09 RX ADMIN — CARVEDILOL 12.5 MG: 6.25 TABLET, FILM COATED ORAL at 08:06

## 2024-06-09 RX ADMIN — SPIRONOLACTONE 25 MG: 25 TABLET ORAL at 08:06

## 2024-06-09 RX ADMIN — THERA TABS 1 TABLET: TAB at 08:06

## 2024-06-09 RX ADMIN — BUDESONIDE INHALATION 0.5 MG: 0.5 SUSPENSION RESPIRATORY (INHALATION) at 07:06

## 2024-06-09 RX ADMIN — INSULIN ASPART 8 UNITS: 100 INJECTION, SOLUTION INTRAVENOUS; SUBCUTANEOUS at 05:06

## 2024-06-09 RX ADMIN — IPRATROPIUM BROMIDE AND ALBUTEROL SULFATE 3 ML: 2.5; .5 SOLUTION RESPIRATORY (INHALATION) at 12:06

## 2024-06-09 RX ADMIN — FERROUS SULFATE TAB 325 MG (65 MG ELEMENTAL FE) 1 EACH: 325 (65 FE) TAB at 08:06

## 2024-06-09 RX ADMIN — IPRATROPIUM BROMIDE AND ALBUTEROL SULFATE 3 ML: 2.5; .5 SOLUTION RESPIRATORY (INHALATION) at 01:06

## 2024-06-09 RX ADMIN — CARVEDILOL 12.5 MG: 6.25 TABLET, FILM COATED ORAL at 05:06

## 2024-06-09 RX ADMIN — Medication 400 MG: at 08:06

## 2024-06-09 RX ADMIN — INSULIN DETEMIR 15 UNITS: 100 INJECTION, SOLUTION SUBCUTANEOUS at 08:06

## 2024-06-09 RX ADMIN — Medication 1000 UNITS: at 08:06

## 2024-06-09 RX ADMIN — Medication 400 MG: at 03:06

## 2024-06-09 RX ADMIN — FUROSEMIDE 40 MG: 40 TABLET ORAL at 08:06

## 2024-06-09 RX ADMIN — INSULIN ASPART 4 UNITS: 100 INJECTION, SOLUTION INTRAVENOUS; SUBCUTANEOUS at 11:06

## 2024-06-09 RX ADMIN — ASPIRIN 81 MG CHEWABLE TABLET 81 MG: 81 TABLET CHEWABLE at 08:06

## 2024-06-10 VITALS
TEMPERATURE: 98 F | WEIGHT: 212.5 LBS | SYSTOLIC BLOOD PRESSURE: 119 MMHG | RESPIRATION RATE: 20 BRPM | HEIGHT: 61 IN | OXYGEN SATURATION: 95 % | BODY MASS INDEX: 40.12 KG/M2 | DIASTOLIC BLOOD PRESSURE: 68 MMHG | HEART RATE: 85 BPM

## 2024-06-10 PROBLEM — R53.1 GENERALIZED WEAKNESS: Status: RESOLVED | Noted: 2024-06-03 | Resolved: 2024-06-10

## 2024-06-10 LAB — GLUCOSE SERPL-MCNC: 142 MG/DL (ref 70–105)

## 2024-06-10 PROCEDURE — 94640 AIRWAY INHALATION TREATMENT: CPT

## 2024-06-10 PROCEDURE — 25000242 PHARM REV CODE 250 ALT 637 W/ HCPCS: Performed by: NURSE PRACTITIONER

## 2024-06-10 PROCEDURE — 99316 NF DSCHRG MGMT 30 MIN+: CPT | Mod: ,,, | Performed by: HOSPITALIST

## 2024-06-10 PROCEDURE — 25000003 PHARM REV CODE 250: Performed by: NURSE PRACTITIONER

## 2024-06-10 PROCEDURE — 63600175 PHARM REV CODE 636 W HCPCS: Performed by: HOSPITALIST

## 2024-06-10 PROCEDURE — 82962 GLUCOSE BLOOD TEST: CPT

## 2024-06-10 RX ADMIN — SPIRONOLACTONE 25 MG: 25 TABLET ORAL at 08:06

## 2024-06-10 RX ADMIN — ASPIRIN 81 MG CHEWABLE TABLET 81 MG: 81 TABLET CHEWABLE at 08:06

## 2024-06-10 RX ADMIN — PREDNISONE 20 MG: 20 TABLET ORAL at 08:06

## 2024-06-10 RX ADMIN — OXYCODONE HYDROCHLORIDE AND ACETAMINOPHEN 500 MG: 500 TABLET ORAL at 08:06

## 2024-06-10 RX ADMIN — Medication 1000 UNITS: at 08:06

## 2024-06-10 RX ADMIN — BUDESONIDE INHALATION 0.5 MG: 0.5 SUSPENSION RESPIRATORY (INHALATION) at 07:06

## 2024-06-10 RX ADMIN — THERA TABS 1 TABLET: TAB at 08:06

## 2024-06-10 RX ADMIN — GABAPENTIN 300 MG: 300 CAPSULE ORAL at 08:06

## 2024-06-10 RX ADMIN — IPRATROPIUM BROMIDE AND ALBUTEROL SULFATE 3 ML: 2.5; .5 SOLUTION RESPIRATORY (INHALATION) at 06:06

## 2024-06-10 RX ADMIN — HYDROCODONE BITARTRATE AND ACETAMINOPHEN 1 TABLET: 10; 325 TABLET ORAL at 08:06

## 2024-06-10 RX ADMIN — AMOXICILLIN AND CLAVULANATE POTASSIUM 1 TABLET: 875; 125 TABLET, FILM COATED ORAL at 08:06

## 2024-06-10 RX ADMIN — HYDRALAZINE HYDROCHLORIDE 100 MG: 25 TABLET ORAL at 08:06

## 2024-06-10 RX ADMIN — CARVEDILOL 12.5 MG: 6.25 TABLET, FILM COATED ORAL at 08:06

## 2024-06-10 RX ADMIN — FUROSEMIDE 40 MG: 40 TABLET ORAL at 08:06

## 2024-06-10 RX ADMIN — IPRATROPIUM BROMIDE AND ALBUTEROL SULFATE 3 ML: 2.5; .5 SOLUTION RESPIRATORY (INHALATION) at 01:06

## 2024-06-10 RX ADMIN — TAMSULOSIN HYDROCHLORIDE 0.4 MG: 0.4 CAPSULE ORAL at 08:06

## 2024-06-10 RX ADMIN — FERROUS SULFATE TAB 325 MG (65 MG ELEMENTAL FE) 1 EACH: 325 (65 FE) TAB at 08:06

## 2024-06-10 RX ADMIN — ALLOPURINOL 300 MG: 300 TABLET ORAL at 08:06

## 2024-06-10 RX ADMIN — Medication 400 MG: at 08:06

## 2024-06-10 NOTE — DISCHARGE SUMMARY
"[facility]  Hospital Medicine  Discharge Summary      Patient Name: Mo Madera  MRN: [id number]  DELANEY: [id number]  Patient Class: IP- Swing  Admission Date: 6/3/2024  Hospital Length of Stay: 7 days  Discharge Date and Time:  06/10/2024 12:09 PM  Attending Physician: Carlos Perez DO   Discharging Provider: Carlos Perez DO  Primary Care Provider: Lesley Henry FNP    Primary Care Team: Networked reference to record PCT     HPI:    Patient is a 74 y.o. white male with PMHx of DM, HTN, and COPD with a medical diagnosis of s/p lumbar laminectomy performed at [facility].  Per RT patient presents with "I know I have to get up and move to get better". Performance deficits affecting function: weakness, impaired endurance, impaired self care skills, impaired functional mobility, gait instability, impaired balance, decreased safety awareness, pain, impaired skin.     * No surgery found *      Hospital Course:   6/03 Patient supine in bed with eyes open in NAD. Patient current pain level 5/10. He states pain not too severe until he tries to move. Midline incision lower back with no drainage or redness of surrounding soft tissue noted. Wound edges well-approximated with staples intact. Patient states he was seen by PT on admission and scheduled to began PT tomorrow. He expresses understanding that PT will be uncomfortable by knows it is necessary to return to normal ADLs.    6/5 1715 RN called to report poc Glucose 526, levemir was ordered to start tonight, encouraged RN to give now and continue SSI as ordered.  Will continue to monitor.     6/5 1959 RN called to report Glucose remains 527 after receiving levemir and SSI at 1730, reports has eaten food that family brought.  Also noted that Pt is receiving prednisone.  Will check BMP and continue to monitor. Na 128.  Pt is unsure why he takes Lasix.    Will tx with IV fluids tonight and recheck in the AM.  Will tx BS with SSI tonight " but may need to adjust levemir dose tomorrow.      6/10 DC home today.  Did well with therapy had follow up at Dr today      Goals of Care Treatment Preferences:  Code Status: Full Code      Consults:   Consults (From admission, onward)          Status Ordering Provider     Inpatient consult to Registered Dietitian/Nutritionist  Once        Provider:  (Not yet assigned)    Completed WILL GARCIA            No new Assessment & Plan notes have been filed under this hospital service since the last note was generated.  Service: Hospital Medicine    Final Active Diagnoses:    Diagnosis Date Noted POA    DM2 (diabetes mellitus, type 2) [E11.9] 06/04/2024 Yes    Surgical wound present [T14.8XXA] 06/03/2024 Yes    S/P lumbar laminectomy [Z98.890] 06/03/2024 Not Applicable      Problems Resolved During this Admission:    Diagnosis Date Noted Date Resolved POA    PRINCIPAL PROBLEM:  Generalized weakness [R53.1] 06/03/2024 06/10/2024 Yes       Discharged Condition: good    Disposition: Home or Self Care    Follow Up:   Follow-up Information       Lesley Henry FNP Follow up in 2 week(s).    Specialty: Family Medicine  Contact information:  [address] [place] [address]  [place] [address]  [phone]                           Patient Instructions:   No discharge procedures on file.    Significant Diagnostic Studies: N/A    Pending Diagnostic Studies:       None           Medications:  Reconciled Home Medications:      Medication List        CONTINUE taking these medications      COREG 12.5 MG tablet  Generic drug: carvediloL  Take 12.5 mg by mouth 2 (two) times daily with meals.     gabapentin 300 MG capsule  Commonly known as: NEURONTIN  Take 300 mg by mouth 2 (two) times daily.     HYDROcodone-acetaminophen 5-325 mg per tablet  Commonly known as: NORCO  Take 1 tablet by mouth every 12 (twelve) hours as needed for Pain (Take one tablet by mouth once or twice dly as needed for pain).     hydrOXYzine HCL 25 MG  tablet  Commonly known as: ATARAX  Take 25 mg by mouth 3 (three) times daily as needed for Itching.     metFORMIN 1000 MG tablet  Commonly known as: GLUCOPHAGE  Take 1,000 mg by mouth 2 (two) times daily with meals.     predniSONE 20 MG tablet  Commonly known as: DELTASONE  Take 20 mg by mouth once daily.     spironolactone 25 MG tablet  Commonly known as: ALDACTONE  Take 25 mg by mouth once daily.     tamsulosin 0.4 mg Cap  Commonly known as: FLOMAX  Take 0.4 mg by mouth once daily.     zolpidem 10 mg Tab  Commonly known as: AMBIEN  Take 10 mg by mouth nightly as needed.              Indwelling Lines/Drains at time of discharge:   Lines/Drains/Airways       None                   Time spent on the discharge of patient: 33 minutes         Carlos Perez DO  Department of Hospital Medicine  [facility]

## 2024-06-10 NOTE — PLAN OF CARE
Problem: Adult Inpatient Plan of Care  Goal: Patient-Specific Goal (Individualized)  Outcome: Progressing

## 2024-06-10 NOTE — PLAN OF CARE
Problem: Adult Inpatient Plan of Care  Goal: Plan of Care Review  Outcome: Met  Goal: Patient-Specific Goal (Individualized)  Outcome: Met  Goal: Absence of Hospital-Acquired Illness or Injury  Outcome: Met  Goal: Optimal Comfort and Wellbeing  Outcome: Met  Goal: Readiness for Transition of Care  Outcome: Met     Problem: Fall Injury Risk  Goal: Absence of Fall and Fall-Related Injury  Outcome: Met     Problem: Skin Injury Risk Increased  Goal: Skin Health and Integrity  Outcome: Met     Problem: Bariatric Environmental Safety  Goal: Safety Maintained with Care  Outcome: Met     Problem: Wound  Goal: Optimal Coping  Outcome: Met  Goal: Optimal Functional Ability  Outcome: Met  Goal: Absence of Infection Signs and Symptoms  Outcome: Met  Goal: Improved Oral Intake  Outcome: Met  Goal: Optimal Pain Control and Function  Outcome: Met  Goal: Skin Health and Integrity  Outcome: Met  Goal: Optimal Wound Healing  Outcome: Met     Problem: Breathing Pattern Ineffective  Goal: Effective Breathing Pattern  Outcome: Met     Problem: Diabetes Comorbidity  Goal: Blood Glucose Level Within Targeted Range  Outcome: Met

## 2024-06-10 NOTE — PLAN OF CARE
Problem: Physical Therapy  Goal: Physical Therapy Goal  Description: STG - 1 week  1. Pt will transfer sit to stand with SBA.-MET  2. Pt will amb 50 ft with CGA with appropriate AD.-MET  3. Pt will transfer supine to/from sit with min Ax1-PROGRESSING.    LTG - 3 weeks  1. Pt will transfer sit to stand with SVN.  2. Pt will amb 250 ft with SBA with appropriate AD.  3. Pt will negotiate steps with rail with CGA.  4. Pt will negotiate ramp using AD with SBA.  5. Pt will transfer supine to/from sit with SBA.  Outcome: Progressing

## 2024-06-10 NOTE — PLAN OF CARE
[facility]  Discharge Final Note    Primary Care Provider: Lesley Henry FNP    Expected Discharge Date: 6/10/2024    Final Discharge Note (most recent)       Final Note - 06/10/24 1438          Final Note    Assessment Type Final Discharge Note     Anticipated Discharge Disposition Home or Self Care     What phone number can be called within the next 1-3 days to see how you are doing after discharge? [phone]     Hospital Resources/Appts/Education Provided Appointments scheduled and added to AVS        Post-Acute Status    Post-Acute Authorization Other     Other Status No Post-Acute Service Needs     Discharge Delays None known at this time                     Important Message from Medicare    6/10/2024 1225         Contact Info       Lesley Henry FNP   Specialty: Family Medicine   Relationship: PCP - General    [address] [place] [address]  [place] [address]   Phone: [phone]       Next Steps: Follow up in 2 week(s)

## 2024-06-10 NOTE — NURSING
Pt left facility with family by POV at 1300. Denied pain and no complaints. Medicine and pocket knife returned to patient upon discharge.

## 2024-06-11 LAB — GLUCOSE SERPL-MCNC: 458 MG/DL (ref 70–105)

## 2024-09-18 DIAGNOSIS — Z98.1 S/P LUMBAR SPINAL FUSION: Primary | ICD-10-CM

## 2024-09-19 DIAGNOSIS — Z98.1 S/P LUMBAR SPINAL FUSION: Primary | ICD-10-CM

## 2024-09-20 ENCOUNTER — CLINICAL SUPPORT (OUTPATIENT)
Dept: REHABILITATION | Facility: HOSPITAL | Age: 74
End: 2024-09-20
Payer: MEDICARE

## 2024-09-20 DIAGNOSIS — Z98.1 S/P LUMBAR SPINAL FUSION: ICD-10-CM

## 2024-09-20 DIAGNOSIS — Z98.890 S/P LUMBAR LAMINECTOMY: Primary | ICD-10-CM

## 2024-09-20 PROCEDURE — 97163 PT EVAL HIGH COMPLEX 45 MIN: CPT

## 2024-09-20 PROCEDURE — 97110 THERAPEUTIC EXERCISES: CPT

## 2024-09-20 NOTE — PROGRESS NOTES
"[facility] OUTPATIENT THERAPY  Physical Therapy Initial Evaluation    Name: Mo Madera  Clinic Number: [id number]    Therapy Diagnosis: No diagnosis found.  Physician: No ref. provider found    Physician Orders: PT Eval and Treat, "Lumbar Spine: core conditioning"   Medical Diagnosis from Referral: s/p Lumbar fusion (Z98.1)  Evaluation Date: 2024  Authorization Period Expiration: ***  Visit # / Visits authorized: 1/ ***    Time In: ***  Time Out: ***  Total Appointment Time (timed & untimed codes): *** minutes    Precautions: {IP WOUND PRECAUTIONS OHS:21063}    Subjective     Date of onset: ***  History of current condition - Mo reports: ***     Medical History:   Past Medical History:   Diagnosis Date    COPD (chronic obstructive pulmonary disease)     Diabetes mellitus     Hypertension        Surgical History:   Mo Madera  has a past surgical history that includes Back surgery.    Medications:   Mo has a current medication list which includes the following prescription(s): carvedilol, gabapentin, hydrocodone-acetaminophen, hydroxyzine hcl, metformin, prednisone, spironolactone, tamsulosin, and zolpidem.    Allergies:   Review of patient's allergies indicates:  No Known Allergies     Imaging:   {DMJIMAGIN}    Prior Therapy: ***  Social History: {LIVES WITH:12409} ***  Occupation: ***  Prior Level of Function: ***  Current Level of Function: ***    Pain:  Current {0-10:06401::"0"}/10, worst {0-10:::"0"}/10, best {0-10:::"0"}/10   Location: {RIGHT LEFT BILATERAL:90953} {LOCATION ON BODY:57647}  Description: {Pain Description:74658}  Aggravating Factors: {Causes; Pain:22787}  Easing Factors: {Pain (activities that relieve):85357}    Pts goals: ***    Objective     Posture:  Standing lordosis: {amsincreasedecrease:71851}  Sitting lordosis: {amsincreasedecrease:66912}  Iliac crest height: {DMJLEFTRIGHTINCREASEDEQUAL:22271:x}  PSIS height: " {DMJLEFTRIGHTINCREASEDEQUAL:96661}  Pelvic rotation/torsion: {YES/NO:20267}  Scoliosis: {YES/NO:20267}  Lateral shift: {RUSH DESC RIGHT/LEFT:10803}    Trunk range of motion:  Thoracolumbar AROM Pain/Dysfunction with Movement   Flexion ***    Extension ***    Right lateral flexion ***    Left lateral flexion ***    Right rotation ***    Left rotation ***      Manual muscle test:  Muscle Right left   Hip flexion {MMT number:00532:::1} {MMT strength:19298:::1}   Hip abduction {MMT strength:89966:::1} {MMT strength:10980:::1}   Knee extension {MMT strength:33416:::1} {MMT strength:09463:::1}   Knee flexion  {MMT strength:02341:::1} {MMT strength:27559:::1}   Ankle dorsiflexion {MMT strength:75963:::1} {MMT strength:32615:::1}   Ankle plantar flexion  {MMT strength:06120:::1} {MMT strength:38678:::1}   Extensor hallucis longus {MMT strength:62057:::1} {MMT strength:71987:::1}     Hip/knee range of motion:  Motion Right Left    Hip flexion (120) {DMJWFL:05744} {DMJWFL:94500}   Hip extension {DMJWFL:48935} {DMJWFL:71095}   Hip abduction {DMJWFL:38840} {DMJWFL:58318}   Hip adduction {DMJWFL:52806} {DMJWFL:32619}   Internal rotation (45) {DMJWFL:60520} {DMJWFL:80231}   External rotation (45) {DMJWFL:32757} {DMJWFL:98503}   Hamstring 90/90 (-10) {DMJWFL:30702} {DMJWFL:77914}   Knee extension {DMJWFL:28415} {DMJWFL:81834}   Knee flexion (120) {DMJWFL:18125} {DMJWFL:89344}   Other *** ***   Other *** ***     Clinical Special Tests:  SLR test < 60 degrees: right {POSITIVE/NEGATIVE:54782}; left {POSITIVE/NEGATIVE:01585}  SLR test > 60 degrees: right {POSITIVE/NEGATIVE:46618}; left {POSITIVE/NEGATIVE:67216}  Sitting slump test: right {POSITIVE/NEGATIVE:22040}; left {POSITIVE/NEGATIVE:59805}  Piriformis test: right {POSITIVE/NEGATIVE:99457}; left {POSITIVE/NEGATIVE:60071}  RUBÉN test: right {POSITIVE/NEGATIVE:32964}; left {POSITIVE/NEGATIVE:06095}  SI forward bend: right {POSITIVE/NEGATIVE:65767}; left {POSITIVE/NEGATIVE:14592}  SI  "distraction: right {POSITIVE/NEGATIVE:97267}; left {POSITIVE/NEGATIVE:68688}  SI compression: right {POSITIVE/NEGATIVE:18142}; left {POSITIVE/NEGATIVE:92108}  Bilateral SLR: right {POSITIVE/NEGATIVE:20392}; left {POSITIVE/NEGATIVE:28738}  Long sit test: right {POSITIVE/NEGATIVE:87568}; left {POSITIVE/NEGATIVE:24693}    Gait assessment:  Step length: {DESC INCREASED/DECREASED:67870} {RIGHT/LEFT:20294}  Step width: {DESC INCREASED/DECREASED:27591} {RIGHT/LEFT:20294}  Caity: {DESC INCREASED/DECREASED:81442} {RIGHT/LEFT:20294}  Antalgic gait: {YES/NO:20267}  Assistive device: {Assistive Device:03774}  Ambulation deviations: ***  Stairs: ***    Spinal mobility: decreased lumbar spine d/t lumbar fusion  Segment: ***    Palpation: ***      Comments: ***    Limitation/Restriction for FOTO Intake Survey    Therapist reviewed FOTO scores for Mo Madera on 9/20/2024.   FOTO documents entered into [employer] - see Media section.    Limitation Score: ***%       TREATMENT     Home Exercises and Patient Education Provided    Education provided:   ***    Written Home Exercises Provided: {Blank single:39484::"yes","Patient instructed to cont prior HEP"}.  Exercises were reviewed and Mo was able to demonstrate them prior to the end of the session.  Mo demonstrated {Desc; good/fair/poor:87398} understanding of the education provided.     See EMR under {Blank single:56190::"Media","Patient Instructions"} for exercises provided {Blank single:72765::"9/20/2024","prior visit"}.    Assessment     Mo is a 74 y.o. male referred to outpatient Physical Therapy with a medical diagnosis of ***. Pt presents with ***    Pt prognosis is Good.   Pt will benefit from skilled outpatient Physical Therapy to address the deficits stated above and in the chart below, provide pt/family education, and to maximize pt's level of independence.     Plan of care discussed with patient: Yes  Pt's spiritual, cultural and educational needs considered and " "patient is agreeable to the plan of care and goals as stated below:     Anticipated Barriers for therapy: none    Decision Making/ Complexity Score: high    Goals:    Short Term Goals: *** weeks   ***    Long Term Goals: *** weeks   ***    Plan     Plan of care Certification: 9/20/2024 to ***.    Outpatient Physical Therapy {NUMBERS 1-5:56558} times weekly for {0-10:10597::"0"} weeks to include the following interventions: {TX PLAN:89503}.       Pepper Helton, PT  [facility]  Physical Therapy Department      I CERTIFY THE NEED FOR THESE SERVICES FURNISHED UNDER THIS PLAN OF TREATMENT AND WHILE UNDER MY CARE.      Physician's Signature: _________________________________________  Date: __________________________     "

## 2024-09-20 NOTE — PLAN OF CARE
"[facility]  Physical Therapy Initial Evaluation    Name: Mo Madera  Clinic Number: [id number]    Therapy Diagnosis: S/P Lumbar fusion  Physician: Dr. Tuan Holland    Physician Orders: PT Eval and Treat, "Lumbar Spine: core conditioning"   Medical Diagnosis from Referral: s/p Lumbar fusion (Z98.1)  Evaluation Date: 9/20/2024  Authorization Period Expiration: 11/1/2024  Visit # / Visits authorized: 1/ 13    Time In: 1054  Time Out: 1201  Total Appointment Time (timed & untimed codes): 67 minutes    Precautions: Standard, Diabetes, and blood thinners    Subjective     Date of onset: 9/16/2024  History of current condition - Mo reports: Pt had lumbar fusion approx May 2024 (pt unsure of date) by Dr. Holland. Has increased LBP with walking, getting out of a chair and standing. Can only walk about 100 ft at a time d/t back tightness/stiffness. He has pulling sensation reaching down to the floor. He c/o feeling unsteady, at times.     Medical History:   Past Medical History:   Diagnosis Date    COPD (chronic obstructive pulmonary disease)     Diabetes mellitus     Hypertension        Surgical History:   Mo Madera  has a past surgical history that includes Back surgery . [facility]os TKA.    Medications:   Mo has a current medication list which includes the following prescription(s): carvedilol, gabapentin, hydrocodone-acetaminophen, hydroxyzine hcl, metformin, prednisone, spironolactone, tamsulosin, and zolpidem.    Allergies:   Review of patient's allergies indicates:  No Known Allergies     Imaging:   X-ray l-spine last week: proper alignment of back hardware.    Prior Therapy: PT after surgery in swing bed unit at [facility].  Social History: lives alone, has stairs with 1 rail to enter home.  Occupation: retired  and heavy equip't   Prior Level of Function: able to walk a mile  Current Level of Function: limited ability to walk up to 100 ft. Has pain with sweeping/mopping and " standing.    Pain:  Current 0/10, worst 10/10, best 0/10   Location: central back over incision and extends posteriorly to BLE from glutes to calves.  Description: Tight and Sharp  Aggravating Factors: Standing, Walking, Night Time, and Flexing  Easing Factors: pain medication, lying down, hot bath, and rest    Pts goals: Wants to be able to walk about an 1/8 of a mile without having to stop.    Objective     Posture:  Standing lordosis: Decreased  Sitting lordosis: Decreased  Iliac crest height: equal  PSIS height: equal  Pelvic rotation/torsion: No  Scoliosis: No  Lateral shift: No    Trunk range of motion:  Thoracolumbar AROM Pain/Dysfunction with Movement   Flexion 50% YES   Extension 100% NO   Right lateral flexion 30% tight   Left lateral flexion 50% tight   Right rotation 75% NO   Left rotation 75% NO     Manual muscle test:  Muscle Right left   Hip flexion MMT number: 5/5 MMT strength: 5/5   Hip abduction MMT strength: 5/5 MMT strength: 5/5   Knee extension MMT strength: 5/5 MMT strength: 5/5   Knee flexion  MMT strength: 5/5 MMT strength: 5/5   Ankle dorsiflexion MMT strength: 4+/5 MMT strength: 4+/5   Ankle plantar flexion  MMT strength: 5/5 MMT strength: 5/5   Extensor hallucis longus MMT strength: 4+/5 MMT strength: 4+/5     Hip/knee range of motion:  Motion Right Left    Hip flexion (120) 98  88   Hip extension WFL WFL   Hip abduction WFL WFL   Hip adduction WFL WFL   Internal rotation (45) 20  23   External rotation (45)  38  35   Hamstring 90/90 (-10)  -15  -20   Knee extension -10 -8   Knee flexion (120) 117  120   Ankle DF 2 6   Other       Clinical Special Tests:  SLR test < 60 degrees: right Negative; left Negative  SLR test > 60 degrees: right Negative; left Negative  Sitting slump test: right Positive; left Negative  Piriformis test: right Negative; left Negative  RUBÉN test: right Negative; left Negative  SI forward bend: right ; left   SI distraction: right ; left   SI compression: right ;  "left   Bilateral SLR: right Negative; left Negative  Long sit test: right ; left     Gait assessment:  Step length: WFL   Step width: increased   Caity: decreased   Antalgic gait: No  Assistive device: none  Ambulation deviations: leans fwd with trunk approx 25 degrees. Unsteady with NBOS and turns.    TINETTI BALANCE ASSESSMENT TOOL    BALANCE SECTION  Patient is seated in hard, armless chair.    1.Sitting Balance: Steady; safe = 1  2.Rises from chair: Able, without using arms = 2  3.Attempts to arise: Able to arise, 1 attempt = 2  4.Immediate standing Balance (first 5 seconds): Unsteady (swaggers, moves feet, trunk sway) = 0  5.Standing balance: Steady but wide stance (medal heel>4" apart) & uses cane or other support = 1  6.Nudged: Begins to fall = 0  7.Eyes closed: Unsteady = 0  8.Turning 360 degrees: Discontinuous steps = 0 and Unsteady (grabs, staggers) = 0  9.Sitting Down: Safe, smooth motion = 2    Balance Score: 8/16    GAIT SECTION  Patient stands with therapist, walks across room (+/- aids), first at usual pace, then at rapid pace.    10.Initiation of Gait (Immediately after told to go.): No hesitancy = 1  11.Step length and height: Step to R=0 and Step to L=0  12.Foot Clearance: L foot clears floor=1 and R foot clears floor=1  13.Step symmetry: Right & Left step length appear equal = 1  14.Step continuity: Steps appear continuous = 1  15.Path: Mild/moderate deviation or uses walking aid = 1  16.Trunk: Marked sway or uses walking aid = 0  17.Walking Time: Heels apart = 0    Gait Score: 6/12  Total Score (Balance + Gait Score): 14/28 without AD     Risk Indicators:    Tinetti Tool Score   Risk of Falls   <=18    High   19-23   Moderate   >=24   Low    Spinal mobility: decreased lumbar spine d/t lumbar fusion. Pt has well healed incisional scar full length of lumbar spine.    Palpation: scar adhesions with decreased motility. Fascial tightness.    Comments: LLE slight shorter in supine. Tight ITB ulices, " tight Pirif ulices, left hip adductors, ulices hip flexors.    Limitation/Restriction for FOTO Intake Survey    Therapist reviewed FOTO scores for Mo Madera on 9/20/2024.   FOTO documents entered into [employer] - see Media section.    Limitation Score: 41%       TREATMENT     Total Treatment time (time-based codes) separate from Evaluation: 10 minutes    Mo received the treatments listed below:  THERAPEUTIC EXERCISES to develop flexibility and posture for 10 minutes including HEP  Home Exercises and Patient Education Provided    Education provided:   Results of eval, POC, HEP    Written Home Exercises Provided: yes. APT/PPT, seated ulices glute stretch  Exercises were reviewed and Mo was able to demonstrate them prior to the end of the session.  Mo demonstrated fair  understanding of the education provided.     See EMR under Media for exercises provided 9/20/2024.    Assessment     Mo is a 74 y.o. male referred to outpatient Physical Therapy with a medical diagnosis of s/p lumbar fusion/laminectomy. Pt presents with pain about incisional scar of 0-10/10 which can extend down BLE posteriorly to calves. He has scar adhesions and a +seated Slump test. He stands with 25 degrees trunk flexion. He has decreased trunk ROM in all planes except extension with pain during flexion and tightness during lateral flexion ulices. He has decreased lumbar lordosis in sitting and standing. He is at high falls risk with Tinetti Balance/Gait score of only 14/28 points.     Pt prognosis is Good.   Pt will benefit from skilled outpatient Physical Therapy to address the deficits stated above and in the chart below, provide pt/family education, and to maximize pt's level of independence.     Plan of care discussed with patient: Yes  Pt's spiritual, cultural and educational needs considered and patient is agreeable to the plan of care and goals as stated below:     Anticipated Barriers for therapy: none    Decision Making/ Complexity Score:  high    Goals:    Short Term Goals: 3 weeks   Pt indep with HEP.  Pt will be able to stand/walk with fully erect posture.  Pt's Tinetti Balance/Gait score will increase to 16/28 without AD.  Pt will tolerate walking 1/8 mile (660 ft) without increase in back pain.  Pt will have ROM BLE: ulices hip flex 110 deg, ulices hip IR 30 deg, ulices DF 10 deg.  Pt will have flexibility WFL ulices Piriformis and ulices ITB.      Long Term Goals: 6 weeks   Pt indep with self-treatment techniques.  Pt will have at least 75% trunk ROM in all planes without pain.  3.   Pt will have ROM BLE: ulices hip flex 120 deg, ulices hip IR 35 deg, ulices DF 15  deg.  4.   Pt's Tinetti Balance/Gait score will increase to 19/28 without AD.  5.   Pt will tolerate walking 1/4 mile (1320 ft) without increase in back pain.  6.   Pt will have a negative seated Slump test RLE.    Plan     Plan of care Certification: 9/20/2024 to 11/1/2024.    Outpatient Physical Therapy 2 times weekly for 6 weeks to include the following interventions: Electrical Stimulation IFC, Gait Training, Manual Therapy, Moist Heat/ Ice, Neuromuscular Re-ed, Therapeutic Exercise, and Ultrasound.       Pepepr Helton, PT  [facility]  Physical Therapy Department      I CERTIFY THE NEED FOR THESE SERVICES FURNISHED UNDER THIS PLAN OF TREATMENT AND WHILE UNDER MY CARE.      Physician's Signature: _________________________________________  Date: __________________________

## 2024-09-24 ENCOUNTER — CLINICAL SUPPORT (OUTPATIENT)
Dept: REHABILITATION | Facility: HOSPITAL | Age: 74
End: 2024-09-24
Payer: MEDICARE

## 2024-09-24 DIAGNOSIS — Z98.1 S/P LUMBAR SPINAL FUSION: Primary | ICD-10-CM

## 2024-09-24 PROCEDURE — 97112 NEUROMUSCULAR REEDUCATION: CPT

## 2024-09-24 PROCEDURE — 97110 THERAPEUTIC EXERCISES: CPT

## 2024-09-24 NOTE — PROGRESS NOTES
"  Physical Therapy Treatment Note     Name: Mo Madera  Clinic Number: [id number]    Therapy Diagnosis: S/P Lumbar fusion   Physician: [facility] *    Visit Date: 9/24/2024    Physician Orders: PT Eval and Treat, "Lumbar Spine: core conditioning"   Medical Diagnosis from Referral: S/P Lumbar fusion  Evaluation Date: 9/20/2024   Authorization Period Expiration: 11/1/2024     Visit # / Visits authorized: 2/13   PTA Visit #: 0/5    Time In: 1022  Time Out: 1045  Total Billable Time: 23 minutes    Precautions:   Standard, Diabetes, and blood thinners  Functional Level Prior to Evaluation: able to walk a mile   Pts goals: Wants to be able to walk about an 1/8 of a mile without having to stop.     Subjective     Pt reports: Pt states he is late because he thought his appt was at 1100 instead of 0845. Pt states he received a steroid injection on each side of lumbar spine at about L4 level and back pain is better. States he still has pain down to calves when walking.    He was compliant with home exercise program.  Response to previous treatment: No adverse reaction voiced.  Functional change: Too soon to determine.    Pain: 3/10  Location: central back     Objective     Mo received therapeutic exercises to develop ROM, flexibility, posture, and core stabilization for 8 minutes including:    Seated HS stretch, ulices 2x30" (not today)   Stretch: ulices Glutes, ulices Pirif, ulices Add in supine 2x30" each (not today)   Stretch: ulices ITB, standing 2x30" each (not today)   Stretch: ulices calves, standing using incline brd 2x30"   Nu-step, Level 3.0 BLE 5 minutes                                                                                       Mo received the following manual therapy techniques: Joint mobilizations, Myofacial release, Soft tissue Mobilization, and TPR were applied to the: xx for 0 minutes, including: (not today)      Mo participated in neuromuscular re-education activities to improve: Balance and " "Posture for 15 minutes. The following activities were included:    Tandem amb fwd/bwd 5ft in parallel bars 2x   Toe taps using 8" step,no UE support 10x   Tandem standing w/one foot on 8" step 30" each no UE support   Trunk rot in NBOS R/L 5x each   Reaching outside NBOS R/L 5x each                                       Mo participated in gait training to improve functional mobility and safety for 0  minutes, including:      Mo received the following direct contact modalities after being cleared for contraindications: Ultrasound:  Mo received ultrasound to manage pain and inflammation at 100 % duty cycle applied to the xx at an intensity of  1.5 box W/cm2  for a duration of 0 minutes. Patient tolerated treatment well without adverse effects. Therapist was in attendance throughout intervention.    Mo received the following supervised modalities after being cleared for contradictions: IFC Electrical Stimulation:  Mo received IFC Electrical Stimulation for pain control applied to the xx. Pt received stimulation at 100 % scan at a frequency of xx for 0 minutes. Mo tolderated treatment well without any adverse effects.      Mo received hot pack for 0 minutes to xx.    Mo received cold pack for 0 minutes to xx.      Home Exercises Provided and Patient Education Provided     Education provided:     Written Home Exercises Provided: Patient instructed to cont prior HEP.  Exercises were reviewed and Mo was able to demonstrate them prior to the end of the session.  Mo demonstrated good  understanding of the education provided.     See EMR under Media for exercises provided prior visit.    Assessment     PT withheld hip stretches and MFR to back/scar this visit due to having back injections yesterday. Today, treatment focused on balance training, primarily.    Mo Is progressing well towards his goals.   Pt prognosis is Good.     Pt will continue to benefit from skilled outpatient physical " therapy to address the deficits listed in the problem list box on initial evaluation, provide pt/family education and to maximize pt's level of independence in the home and community environment.     Pt's spiritual, cultural and educational needs considered and pt agreeable to plan of care and goals.     Anticipated barriers to physical therapy: none    Goals:  Short Term Goals: 3 weeks   Pt indep with HEP.  Pt will be able to stand/walk with fully erect posture.  Pt's Tinetti Balance/Gait score will increase to 16/28 without AD.  Pt will tolerate walking 1/8 mile (660 ft) without increase in back pain.  Pt will have ROM BLE: ulices hip flex 110 deg, ulices hip IR 30 deg, ulices DF 10 deg.  Pt will have flexibility WFL ulices Piriformis and ulices ITB.        Long Term Goals: 6 weeks   Pt indep with self-treatment techniques.  Pt will have at least 75% trunk ROM in all planes without pain.  3.   Pt will have ROM BLE: ulices hip flex 120 deg, ulices hip IR 35 deg, ulices DF 15  deg.  4.   Pt's Tinetti Balance/Gait score will increase to 19/28 without AD.  5.   Pt will tolerate walking 1/4 mile (1320 ft) without increase in back pain.  6.   Pt will have a negative seated Slump test RLE.    Plan     NEXT VISIT: stretch ulices hips/knees and MFR to back/scar.    Plan of care Certification: 9/20/2024 to 11/1/2024.     Outpatient Physical Therapy 2 times weekly for 6 weeks to include the following interventions: Electrical Stimulation IFC, Gait Training, Manual Therapy, Moist Heat/ Ice, Neuromuscular Re-ed, Therapeutic Exercise, and Ultrasound.        Pepper Helton, PT  9/24/2024

## 2024-09-26 ENCOUNTER — CLINICAL SUPPORT (OUTPATIENT)
Dept: REHABILITATION | Facility: HOSPITAL | Age: 74
End: 2024-09-26
Payer: MEDICARE

## 2024-09-26 DIAGNOSIS — Z98.1 S/P LUMBAR SPINAL FUSION: Primary | ICD-10-CM

## 2024-09-26 PROCEDURE — 97110 THERAPEUTIC EXERCISES: CPT

## 2024-09-26 NOTE — PROGRESS NOTES
"  Physical Therapy Treatment Note     Name: Mo Madera  Clinic Number: [id number]    Therapy Diagnosis: S/P Lumbar fusion   Physician: [facility] *    Visit Date: 9/26/2024    Physician Orders: PT Eval and Treat, "Lumbar Spine: core conditioning"   Medical Diagnosis from Referral: S/P Lumbar fusion  Evaluation Date: 9/20/2024   Authorization Period Expiration: 11/1/2024     Visit # / Visits authorized: 3/13   PTA Visit #: 0/5    Time In: 0854  Time Out: 0915  Total Billable Time: 17 minutes    Precautions:   Standard, Diabetes, and blood thinners  Functional Level Prior to Evaluation: able to walk a mile   Pts goals: Wants to be able to walk about an 1/8 of a mile without having to stop.     Subjective     Pt reports: Pt states he gets panic attacks and can't lie supine very long.    He was compliant with home exercise program.  Response to previous treatment: No adverse reaction voiced.  Functional change: Too soon to determine.    Pain: 5/10  Location: central back     Objective     Mo received therapeutic exercises to develop ROM, flexibility, posture, and core stabilization for 11 minutes including:    Seated HS stretch, ulices 2x30" (passive today)   Stretch: ulices Glutes, ulices Pirif, ulices Add in supine 1x30" each    Stretch: ulices ITB, standing 2x30" each (passive today)   Stretch: ulices calves, standing using incline brd 2x30"   Nu-step, Level 3.0 BLE 5 minutes (not today)                                                                                       Mo received the following manual therapy techniques: Joint mobilizations, Myofacial release, Soft tissue Mobilization, and TPR were applied to the: scar at lumbar spine for 6 minutes, including: MFR to scar using crossed hands in diff directions with pt supine. Pt then c/o having a panic attack while supine so PT started to change his position to side lying, but pt stated he needed to walk around and get fresh air outdoors. Pt return a couple " "minutes later.      Mo participated in neuromuscular re-education activities to improve: Balance and Posture for xx minutes. The following activities were included: (not today)    Tandem amb fwd/bwd 5ft in parallel bars 2x (not today)   Toe taps using 8" step,no UE support 10x (not today)   Tandem standing w/one foot on 8" step 30" each no UE support (not today)   Trunk rot in NBOS R/L 5x each (not today)   Reaching outside NBOS R/L 5x each (not today)                                       Mo participated in gait training to improve functional mobility and safety for 0  minutes, including:(not today)      Mo received the following direct contact modalities after being cleared for contraindications: Ultrasound:  Mo received ultrasound to manage pain and inflammation at 100 % duty cycle applied to the xx at an intensity of  1.5 box W/cm2  for a duration of 0 minutes. Patient tolerated treatment well without adverse effects. Therapist was in attendance throughout intervention.(not today)    Mo received the following supervised modalities after being cleared for contradictions: IFC Electrical Stimulation:  Mo received IFC Electrical Stimulation for pain control applied to the xx. Pt received stimulation at 100 % scan at a frequency of xx for 0 minutes. Mo tolderated treatment well without any adverse effects.  (not today)    Mo received hot pack for 0 minutes to xx.    Mo received cold pack for 0 minutes to xx.      Home Exercises Provided and Patient Education Provided     Education provided:     Written Home Exercises Provided: Patient instructed to cont prior HEP.  Exercises were reviewed and Mo was able to demonstrate them prior to the end of the session.  Mo demonstrated good  understanding of the education provided.     See EMR under Media for exercises provided prior visit.    Assessment     PT began having difficulty breathing during MFR to scar in supine which then set off a " panic attack. PT attempted to resume MFR in side lying, but he had to get up and walk around. He returned in a couple of minutes and performed a standing calf stretch, but then requested to cut treatment short and was very apologetic. PT reassured the pt that MFR would be done in side lying from now on.    Mo Is progressing well towards his goals.   Pt prognosis is Good.     Pt will continue to benefit from skilled outpatient physical therapy to address the deficits listed in the problem list box on initial evaluation, provide pt/family education and to maximize pt's level of independence in the home and community environment.     Pt's spiritual, cultural and educational needs considered and pt agreeable to plan of care and goals.     Anticipated barriers to physical therapy: none    Goals:  Short Term Goals: 3 weeks   Pt indep with HEP.  Pt will be able to stand/walk with fully erect posture.  Pt's Tinetti Balance/Gait score will increase to 16/28 without AD.  Pt will tolerate walking 1/8 mile (660 ft) without increase in back pain.  Pt will have ROM BLE: ulices hip flex 110 deg, ulices hip IR 30 deg, ulices DF 10 deg.  Pt will have flexibility WFL ulices Piriformis and ulices ITB.        Long Term Goals: 6 weeks   Pt indep with self-treatment techniques.  Pt will have at least 75% trunk ROM in all planes without pain.  3.   Pt will have ROM BLE: ulices hip flex 120 deg, ulices hip IR 35 deg, ulices DF 15  deg.  4.   Pt's Tinetti Balance/Gait score will increase to 19/28 without AD.  5.   Pt will tolerate walking 1/4 mile (1320 ft) without increase in back pain.  6.   Pt will have a negative seated Slump test RLE.    Plan     NEXT VISIT: MFR to back/scar, but in side lying. Cont with stretches to hips/knees. Pt will need to be issued stretches.    Plan of care Certification: 9/20/2024 to 11/1/2024.     Outpatient Physical Therapy 2 times weekly for 6 weeks to include the following interventions: Electrical Stimulation IFC, Gait  Training, Manual Therapy, Moist Heat/ Ice, Neuromuscular Re-ed, Therapeutic Exercise, and Ultrasound.        Pepper Helton, PT  9/26/2024

## 2024-10-01 ENCOUNTER — CLINICAL SUPPORT (OUTPATIENT)
Dept: REHABILITATION | Facility: HOSPITAL | Age: 74
End: 2024-10-01
Payer: MEDICARE

## 2024-10-01 DIAGNOSIS — Z98.1 S/P LUMBAR SPINAL FUSION: Primary | ICD-10-CM

## 2024-10-01 DIAGNOSIS — Z98.890 S/P LUMBAR LAMINECTOMY: ICD-10-CM

## 2024-10-01 PROCEDURE — 97014 ELECTRIC STIMULATION THERAPY: CPT | Mod: CQ

## 2024-10-01 PROCEDURE — 97110 THERAPEUTIC EXERCISES: CPT | Mod: CQ

## 2024-10-01 PROCEDURE — 97112 NEUROMUSCULAR REEDUCATION: CPT | Mod: CQ

## 2024-10-01 NOTE — PROGRESS NOTES
"  Physical Therapy Treatment Note     Name: Mo Madera  [facility] Number: [id number]    Therapy Diagnosis:   Encounter Diagnoses   Name Primary?    S/P lumbar spinal fusion Yes    S/P lumbar laminectomy      Physician: [facility] *    Visit Date: 10/1/2024    Physician Orders: PT Eval and Treat, "Lumbar Spine: core conditioning"   Medical Diagnosis from Referral: s/p Lumbar fusion (Z98.1)  Evaluation Date: 9/20/2024  Authorization Period Expiration: 11/1/2024  Visit # / Visits authorized: 4/ 13  PTA Visit #: 1    Time In: 0840  Time Out: 0930  Total Billable Time: 50 minutes    Precautions: Standard; spinal; diabetic    Received Plan of Care per Serena Hernandez PT     Subjective     Pt reports: pain as noted; no pain meds taken; says he thinks the surgery went well overall.  He was compliant with home exercise program.  Response to previous treatment: no c/o   Functional change: ongoing    Pain: 4/10  Location: bilateral back      Objective     Range of motion measures:   Hip flexion  100 left  100 right   Hamstring   -8 left   -15 right   Dorsiflexion (not assessed today)    Mo received therapeutic exercises to develop strength, endurance, ROM, and flexibility for 18 minutes including:  Nustep x 5 minutes   Slant board bilateral calf stretch x 2 minutes   Hamstring stretch on step, bilaterally, 3x20 second hold   BLE stretching, 3x20 second hold each: single knee to chest, hamstring, hip external rotation, piriformis    Mo participated in neuromuscular re-education activities to improve: Balance, Coordination, Proprioception, Posture, and core stabilization for   17 minutes. The following activities were included:  Supine swiss ball core stabilization exercises x 15 repetitions each: hamstring rolling, trunk rotation, short arc quads w/ dorsiflexion   Bridging with hip adduction isometrics x 15 repetitions   Hooklying hip abduction x 15 repetitions, blue band  Standing scapular retraction with " rows, BUE extension x 15 repetitions each; blue band    Mo participated in dynamic functional therapeutic activities to improve functional performance for 0  minutes, including:  Sit to stand  Step ups    Mo participated in gait training to improve functional mobility and safety for 0  minutes, including:    Mo received the following manual therapy techniques:  were applied to the: NA for 0 minutes, including:    Mo received the following direct contact modalities after being cleared for contraindications:     Mo received the following supervised modalities after being cleared for contradictions: IFC Electrical Stimulation:  Mo received IFC Electrical Stimulation for pain control applied to the low back. Pt received stimulation at 100 % scan at a frequency of 15 for 10 minutes +5 minutes set up. Mo tolerated treatment well without any adverse effects.    Mo received hot pack for 10 minutes to back with IFC.      Home Exercises Provided and Patient Education Provided     Education provided: continue current home exercise program, pain free, adding exercises and stretches done today; edu pt in home TENS/IFC units that can be ordered online; issued blue resistance band to use with home exercise program     Written Home Exercises Provided: yes.  Exercises were reviewed and Mo was able to demonstrate them prior to the end of the session.  Mo demonstrated fair  understanding of the education provided.     See EMR under Patient Instructions for exercises provided  during sessions prn .    Assessment     Improving hip flexion range of motion and left hamstring range of motion; no pain at treatment end   Mo Is progressing well towards his goals.   Pt prognosis is Good.     Pt will continue to benefit from skilled outpatient physical therapy to address the deficits listed in the problem list box on initial evaluation, provide pt/family education and to maximize pt's level of independence in  the home and community environment.      Anticipated barriers to physical therapy: home exercise program compliance, co-morbidities     Goals:  Short Term Goals: 3 weeks   Pt indep with HEP.  Pt will be able to stand/walk with fully erect posture.  Pt's Tinetti Balance/Gait score will increase to 16/28 without AD.  Pt will tolerate walking 1/8 mile (660 ft) without increase in back pain.  Pt will have ROM BLE: ulices hip flex 110 deg, ulices hip IR 30 deg, ulices DF 10 deg.  Pt will have flexibility WFL ulices Piriformis and ulices ITB.      Long Term Goals: 6 weeks   Pt indep with self-treatment techniques.  Pt will have at least 75% trunk ROM in all planes without pain.  3.   Pt will have ROM BLE: ulices hip flex 120 deg, ulices hip IR 35 deg, ulices DF 15  deg.  4.   Pt's Tinetti Balance/Gait score will increase to 19/28 without AD.  5.   Pt will tolerate walking 1/4 mile (1320 ft) without increase in back pain.  6.   Pt will have a negative seated Slump test RLE.    Plan     Plan of care Certification: 9/20/2024 to 11/1/2024.     Outpatient Physical Therapy 2 times weekly for 6 weeks to include the following interventions: Electrical Stimulation IFC, Gait Training, Manual Therapy, Moist Heat/ Ice, Neuromuscular Re-ed, Therapeutic Exercise, and Ultrasound.     Continue per Plan of Care and progress as pt able  Thuy Patrick, PTA  10/1/2024

## 2024-10-02 NOTE — PROGRESS NOTES
"  Physical Therapy Treatment Note     Name: Mo Madera  Clinic Number: [id number]    Therapy Diagnosis:   No diagnosis found.    Physician: [facility] *    Visit Date: 10/2/2024    Physician Orders: PT Eval and Treat, "Lumbar Spine: core conditioning"   Medical Diagnosis from Referral: s/p Lumbar fusion (Z98.1)  Evaluation Date: 9/20/2024  Authorization Period Expiration: 11/1/2024  Visit # / Visits authorized: 5/ 13  PTA Visit #: 0    Time In: 0843  Time Out: 0926  Total Billable Time: 43 minutes    Precautions: Standard; spinal; diabetic        Subjective     Pt reports: pain is about the same  He was compliant with home exercise program.  Response to previous treatment: no c/o   Functional change: ongoing    Pain: 4/10  Location: bilateral back      Objective       Mo received therapeutic exercises to develop strength, endurance, ROM, and flexibility for 15 minutes including:  Nustep x 5 minutes   Slant board bilateral calf stretch x 2 minutes   Hamstring stretch on step, bilaterally, 3x20 second hold   BLE stretching, 3x20 second hold each: single knee to chest, hamstring, hip external rotation, piriformis    Mo participated in neuromuscular re-education activities to improve: Balance, Coordination, Proprioception, Posture, and core stabilization for   17 minutes. The following activities were included:  Supine swiss ball core stabilization exercises x 30 repetitions each: hamstring rolling, trunk rotation, short arc quads w/ dorsiflexion   Bridging with hip adduction isometrics x 15 repetitions   Hooklying hip abduction x 20 repetitions, blue band  Standing scapular retraction with rows, BUE extension x 3x10 repetitions each; red band  Standing bilateral shoulder ext with red band 2 x 10    Mo participated in dynamic functional therapeutic activities to improve functional performance for 0  minutes, including:  Sit to stand  Step ups    Mo participated in gait training to improve " functional mobility and safety for 0  minutes, including:    Mo received the following manual therapy techniques:  were applied to the: NA for 0 minutes, including:    Mo received the following direct contact modalities after being cleared for contraindications:     Mo received the following supervised modalities after being cleared for contradictions: IFC Electrical Stimulation:  Mo received IFC Electrical Stimulation for pain control applied to the low back. Pt received stimulation at 100 % scan at a frequency of 15 for 10 minutes +5 minutes set up. Mo tolerated treatment well without any adverse effects.      Mo received hot pack for 10 minutes to back with IFC.      Home Exercises Provided and Patient Education Provided     Education provided: continue current home exercise program, pain free, adding exercises and stretches done today; edu pt in home TENS/IFC units that can be ordered online; issued blue resistance band to use with home exercise program     Written Home Exercises Provided: yes.  Exercises were reviewed and Mo was able to demonstrate them prior to the end of the session.  Mo demonstrated fair  understanding of the education provided.     See EMR under Patient Instructions for exercises provided  during sessions prn .    Assessment     Patient without difficulty noted with exercise today. Overall progressing well and 0/10 after session  Mo Is progressing well towards his goals.   Pt prognosis is Good.     Pt will continue to benefit from skilled outpatient physical therapy to address the deficits listed in the problem list box on initial evaluation, provide pt/family education and to maximize pt's level of independence in the home and community environment.      Anticipated barriers to physical therapy: home exercise program compliance, co-morbidities     Goals:  Short Term Goals: 3 weeks   Pt indep with HEP.  Pt will be able to stand/walk with fully erect posture.  Pt's  Tinetti Balance/Gait score will increase to 16/28 without AD.  Pt will tolerate walking 1/8 mile (660 ft) without increase in back pain.  Pt will have ROM BLE: ulices hip flex 110 deg, ulices hip IR 30 deg, ulices DF 10 deg.  Pt will have flexibility WFL ulices Piriformis and ulices ITB.      Long Term Goals: 6 weeks   Pt indep with self-treatment techniques.  Pt will have at least 75% trunk ROM in all planes without pain.  3.   Pt will have ROM BLE: ulices hip flex 120 deg, ulices hip IR 35 deg, ulices DF 15  deg.  4.   Pt's Tinetti Balance/Gait score will increase to 19/28 without AD.  5.   Pt will tolerate walking 1/4 mile (1320 ft) without increase in back pain.  6.   Pt will have a negative seated Slump test RLE.    Plan     Plan of care Certification: 9/20/2024 to 11/1/2024.     Outpatient Physical Therapy 2 times weekly for 6 weeks to include the following interventions: Electrical Stimulation IFC, Gait Training, Manual Therapy, Moist Heat/ Ice, Neuromuscular Re-ed, Therapeutic Exercise, and Ultrasound.     Continue per Plan of Care and progress as pt able  [facility] [place], PT, ATP    10/2/2024

## 2024-10-03 ENCOUNTER — CLINICAL SUPPORT (OUTPATIENT)
Dept: REHABILITATION | Facility: HOSPITAL | Age: 74
End: 2024-10-03
Payer: MEDICARE

## 2024-10-03 DIAGNOSIS — Z98.890 S/P LUMBAR LAMINECTOMY: Primary | ICD-10-CM

## 2024-10-03 PROCEDURE — 97110 THERAPEUTIC EXERCISES: CPT

## 2024-10-03 PROCEDURE — 97112 NEUROMUSCULAR REEDUCATION: CPT

## 2024-10-03 PROCEDURE — 97014 ELECTRIC STIMULATION THERAPY: CPT

## 2024-10-11 ENCOUNTER — CLINICAL SUPPORT (OUTPATIENT)
Dept: REHABILITATION | Facility: HOSPITAL | Age: 74
End: 2024-10-11
Payer: MEDICARE

## 2024-10-11 DIAGNOSIS — Z98.1 S/P LUMBAR SPINAL FUSION: Primary | ICD-10-CM

## 2024-10-11 PROCEDURE — 97014 ELECTRIC STIMULATION THERAPY: CPT

## 2024-10-11 PROCEDURE — 97112 NEUROMUSCULAR REEDUCATION: CPT

## 2024-10-11 PROCEDURE — 97010 HOT OR COLD PACKS THERAPY: CPT

## 2024-10-11 PROCEDURE — 97110 THERAPEUTIC EXERCISES: CPT

## 2024-10-11 NOTE — PROGRESS NOTES
"  Physical Therapy Treatment Note     Name: Mo Madera  Clinic Number: [id number]    Therapy Diagnosis: Z98.1 (ICD-10-CM) - S/P lumbar spinal fusion    Physician: [facility] *    Visit Date: 10/11/2024    Physician Orders: PT Eval and Treat, "Lumbar Spine: core conditioning"   Medical Diagnosis from Referral: s/p Lumbar fusion (Z98.1)  Evaluation Date: 9/20/2024  Authorization Period Expiration: 11/1/2024  Visit # / Visits authorized: 6/13  PTA Visit #: 0    Time In: 1012  Time Out: 1105  Total Billable Time: 50 minutes    Precautions: Standard; spinal; diabetic    Subjective     Pt reports: He is doing okay. Patient reports he does not have a lot of low back pain unless he is walking.     He was compliant with home exercise program.  Response to previous treatment: no complaints  Functional change: ongoing    Pain: 0/10 (at rest)  Location: bilateral low back      Objective     Mo received therapeutic exercises to develop strength, endurance, ROM, and flexibility for 13 minutes including:    NuStep: x 5 minutes   Calf stretch on slant board: 2 minutes  Hamstring stretch on step: 3 x 20 second holds each  Single knee to chest stretch in supine: 3 x 20 second holds each    Hip external rotation stretch in hooklying: 3 x 20 second holds each (not performed)   Seated piriformis stretch: 3 x 20 second holds (not performed)     Mo participated in neuromuscular re-education activities to improve: Balance, Coordination, Proprioception, Posture, and core stabilization for 17 minutes. The following activities were included:    Standing hip abduction in parallel bars: red theraband; x 20 reps each  Standing rows: red theraband; x 20 reps  Standing scapular retractions with upper extremity extension: x 20 reps  Standing chest press with emphasis on core contraction: blue theraband; x 20 reps each direction    Supine swiss ball core stabilization exercises x 30 repetitions each: hamstring rolling, trunk " rotation, short arc quads w/ dorsiflexion (not performed)   Bridging with hip adduction isometrics x 15 repetitions (not performed)   Hooklying hip abduction x 20 repetitions, blue band (not performed)    Mo participated in dynamic functional therapeutic activities to improve functional performance for 5 minutes, including:    Forward step ups on a 6-inch step with bilateral upper extremity support: x 20 reps each    Sit to stand (not performed)     Mo participated in gait training to improve functional mobility and safety for 0 minutes, including:    (not performed)     Mo received the following manual therapy techniques for 0 minutes, including:    (not performed)     Mo received the following direct contact modalities after being cleared for contraindications:     Mo received the following supervised modalities after being cleared for contradictions: IFC Electrical Stimulation for pain control applied to the low back. Pt received stimulation at 100 % scan at a frequency of  Hz and an intensity of 12.5 V for 15 minutes. Mo tolerated treatment well without any adverse effects.      (not performed)     Mo received hot pack for 15 minutes to the low back in conjunction with IFC electrical stimulation.    Home Exercises Provided and Patient Education Provided     Education provided: continue current home exercise program, pain free, adding exercises and stretches done today; edu pt in home TENS/IFC units that can be ordered online; issued blue resistance band to use with home exercise program     Written Home Exercises Provided: yes.  Exercises were reviewed and Mo was able to demonstrate them prior to the end of the session.  Mo demonstrated fair  understanding of the education provided.     See EMR under Patient Instructions for exercises provided  during sessions prn .    Assessment     Patient with good effort throughout treatment. Patient reported he felt as though he was going to  have a panic attack when lying in supine. Patient reports this happens periodically, and he typically  uses his inhaler. Patient walked to his truck but did not have his inhaler with him. The remainder of patient's exercises were performed in standing today with no further issues. Physical Therapist will continue to progress therapeutic exercise, neuromuscular re-education, therapeutic activities, and gait training as able with manual therapy and modalities utilized as needed.     Mo Is progressing well towards his goals.   Pt prognosis is Good.     Pt will continue to benefit from skilled outpatient physical therapy to address the deficits listed in the problem list box on initial evaluation, provide pt/family education and to maximize pt's level of independence in the home and community environment.      Anticipated barriers to physical therapy: home exercise program compliance, co-morbidities     Goals:    Short Term Goals: 3 weeks   Pt indep with HEP.  Pt will be able to stand/walk with fully erect posture.  Pt's Tinetti Balance/Gait score will increase to 16/28 without AD.  Pt will tolerate walking 1/8 mile (660 ft) without increase in back pain.  Pt will have ROM BLE: ulices hip flex 110 deg, ulices hip IR 30 deg, ulices DF 10 deg.  Pt will have flexibility WFL ulices Piriformis and ulices ITB.      Long Term Goals: 6 weeks   Pt indep with self-treatment techniques.  Pt will have at least 75% trunk ROM in all planes without pain.  3.   Pt will have ROM BLE: ulices hip flex 120 deg, ulices hip IR 35 deg, ulices DF 15  deg.  4.   Pt's Tinetti Balance/Gait score will increase to 19/28 without AD.  5.   Pt will tolerate walking 1/4 mile (1320 ft) without increase in back pain.  6.   Pt will have a negative seated Slump test RLE.    Plan     Plan of care Certification: 9/20/2024 to 11/1/2024.     Outpatient Physical Therapy 2 times weekly for 6 weeks to include the following interventions: Electrical Stimulation IFC, Gait Training,  Manual Therapy, Moist Heat/ Ice, Neuromuscular Re-ed, Therapeutic Exercise, and Ultrasound.     Patient will continue to benefit from skilled physical therapy treatment as prescribed working towards goals listed above to maximize functional potential.       KEVIN LUGO, PT, DPT  10/11/2024

## 2024-10-13 NOTE — PROGRESS NOTES
"  Physical Therapy Treatment Note     Name: Mo Madera  Clinic Number: [id number]    Therapy Diagnosis: Z98.1 (ICD-10-CM) - S/P lumbar spinal fusion    Physician: Tuan Holland    Visit Date: 10/13/2024    Physician Orders: PT Eval and Treat, "Lumbar Spine: core conditioning"   Medical Diagnosis from Referral: s/p Lumbar fusion (Z98.1)  Evaluation Date: 9/20/2024  Authorization Period Expiration: 11/1/2024  Visit # / Visits authorized: 7/13  PTA Visit #: 0    Time In: 0806   Time Out: 0929  Total Billable Time: 23 minutes    Precautions: Standard; spinal; diabetic    Subjective     Pt reports: patient 20 minutes late for appointment stating he just could not get out of bed this morning, He reports that he really is not having any pain unless he is walking very far and he would prefer to be discharged to a [facility].     He was compliant with home exercise program.  Response to previous treatment: no complaints  Functional change: ongoing    Pain: 0/10 (at rest)  Location: bilateral low back      Objective     Mo received therapeutic exercises to develop strength, endurance, ROM, and flexibility for 8 minutes including:    NuStep: x 4 minutes   Calf stretch on slant board: 2 minutes  Hamstring stretch in chair x 2 each LE 10 seconds each      Mo participated in neuromuscular re-education activities to improve: Balance, Coordination, Proprioception, Posture, and core stabilization for 15 minutes. The following activities were included:    Standing hip abduction in parallel bars: red theraband; x 20 reps each  Standing hip ext with band in bars x 20 each   Standing rows: red theraband; x 20 reps  Standing scapular retractions with upper extremity extension: x 20 reps  Bilateral shoulder ext with red band 2 x 10 reps      Home Exercises Provided and Patient Education Provided     Education provided: continue current home exercise program, pain free, adding exercises and stretches done today; edu pt in home TENS/IFC units " that can be ordered online; issued blue resistance band to use with home exercise program     Written Home Exercises Provided: yes.  Exercises were reviewed and Mo was able to demonstrate them prior to the end of the session.  Mo demonstrated fair  understanding of the education provided.     See EMR under Patient Instructions for exercises provided  during sessions prn .      Assessment     Patient reporting no pain today and reports he is ready for discharge.  Some goals not met but functional level and HEP appropriate due to pain and independence    Mo Is progressing well towards his goals.   Pt prognosis is Good.     Pt will benefit from HEP     Anticipated barriers to physical therapy: home exercise program compliance, co-morbidities     Goals:    Short Term Goals: 3 weeks   Pt indep with HEP.  met  Pt will be able to stand/walk with fully erect posture. - not met (patient reports he has not stood erect in several years)   Pt's Tinetti Balance/Gait score will increase to 16/28 without AD. - met  Pt will tolerate walking 1/8 mile (660 ft) without increase in back pain.- partially met  Pt will have ROM BLE: ulices hip flex 110 deg, ulices hip IR 30 deg, ulices DF 10 deg.  met  Pt will have flexibility WFL ulices Piriformis and ulices ITB. - met     Long Term Goals: 6 weeks   Pt indep with self-treatment techniques.- met  Pt will have at least 75% trunk ROM in all planes without pain. - met  3.   Pt will have ROM BLE: ulices hip flex 120 deg, ulices hip IR 35 deg, ulices DF 15  deg.- met  4.   Pt's Tinetti Balance/Gait score will increase to 19/28 without AD. - met  5.   Pt will tolerate walking 1/4 mile (1320 ft) without increase in back pain. - not met  6.   Pt will have a negative seated Slump test RLE. - met    Plan     Plan of care Certification: 9/20/2024 to 11/1/2024.     Discharge to [facility]      CECI LANCASTER, PT, ATP  10/13/2024

## 2024-10-15 ENCOUNTER — CLINICAL SUPPORT (OUTPATIENT)
Dept: REHABILITATION | Facility: HOSPITAL | Age: 74
End: 2024-10-15
Payer: MEDICARE

## 2024-10-15 DIAGNOSIS — Z98.1 S/P LUMBAR SPINAL FUSION: Primary | ICD-10-CM

## 2024-10-15 PROCEDURE — 97112 NEUROMUSCULAR REEDUCATION: CPT

## 2024-10-15 PROCEDURE — 97110 THERAPEUTIC EXERCISES: CPT

## 2024-10-15 NOTE — PLAN OF CARE
[facility]  PT Discharge Note    Name: Mo Madera  [facility] Number: [id number]    Therapy Diagnosis: Back pain  Physician: Dr. Tuan Holland    Physician Orders: Eval and treat  Medical Diagnosis from Referral: s/p Lumbar fusion (Z98.1)  Evaluation Date: 9/20/2024      Date of Last visit: 10/15/24  Total Visits Received: 7    ASSESSMENT      Patient reported today on arrival to PT that his back rarely hurts unless he is walking a long ways and he would prefer to discharge to HEP at this time. I educated him on thorough HEP for therapy carryover.     Discharge reason: Patient requested discharge    Discharge FOTO Score: 72    Goals:   Short Term Goals: 3 weeks   Pt indep with HEP.  met  Pt will be able to stand/walk with fully erect posture. - not met (patient reports he has not stood erect in several years)   Pt's Tinetti Balance/Gait score will increase to 16/28 without AD. - met  Pt will tolerate walking 1/8 mile (660 ft) without increase in back pain.- partially met  Pt will have ROM BLE: ulices hip flex 110 deg, ulices hip IR 30 deg, ulices DF 10 deg.  met  Pt will have flexibility WFL ulices Piriformis and ulices ITB. - met     Long Term Goals: 6 weeks   Pt indep with self-treatment techniques.- met  Pt will have at least 75% trunk ROM in all planes without pain. - met  3.   Pt will have ROM BLE: ulices hip flex 120 deg, ulices hip IR 35 deg, ulices DF 15  deg.- met  4.   Pt's Tinetti Balance/Gait score will increase to 19/28 without AD. - met  5.   Pt will tolerate walking 1/4 mile (1320 ft) without increase in back pain. - not met  6.   Pt will have a negative seated Slump test RLE. - met    PLAN   This patient is discharged from Physical Therapy      CECI LNACASTER, PT , ATP  10/15/2024